# Patient Record
Sex: FEMALE | Race: WHITE | Employment: FULL TIME | URBAN - METROPOLITAN AREA
[De-identification: names, ages, dates, MRNs, and addresses within clinical notes are randomized per-mention and may not be internally consistent; named-entity substitution may affect disease eponyms.]

---

## 2017-01-13 RX ORDER — IBUPROFEN 800 MG/1
TABLET ORAL
Qty: 120 TABLET | Refills: 0 | Status: SHIPPED | OUTPATIENT
Start: 2017-01-13 | End: 2017-04-01 | Stop reason: SDUPTHER

## 2017-04-03 RX ORDER — IBUPROFEN 800 MG/1
TABLET ORAL
Qty: 120 TABLET | Refills: 0 | Status: SHIPPED | OUTPATIENT
Start: 2017-04-03 | End: 2017-07-13 | Stop reason: SDUPTHER

## 2017-06-14 RX ORDER — IBUPROFEN 800 MG/1
TABLET ORAL
Qty: 120 TABLET | Refills: 0 | Status: SHIPPED | OUTPATIENT
Start: 2017-06-14 | End: 2018-03-09

## 2018-09-27 ENCOUNTER — HOSPITAL ENCOUNTER (OUTPATIENT)
Dept: WOMENS IMAGING | Age: 43
Discharge: HOME | End: 2018-09-29
Payer: COMMERCIAL

## 2018-09-27 DIAGNOSIS — Z12.39 BREAST CANCER SCREENING: ICD-10-CM

## 2018-09-27 DIAGNOSIS — N64.4 PAIN OF BOTH BREASTS: ICD-10-CM

## 2018-09-27 DIAGNOSIS — N63.0 BREAST MASS IN FEMALE: ICD-10-CM

## 2018-09-27 PROCEDURE — G0279 TOMOSYNTHESIS, MAMMO: HCPCS

## 2018-09-27 PROCEDURE — 76642 ULTRASOUND BREAST LIMITED: CPT

## 2018-10-01 PROBLEM — E55.9 VITAMIN D DEFICIENCY: Status: ACTIVE | Noted: 2018-10-01

## 2018-10-01 PROBLEM — E78.2 MIXED HYPERLIPIDEMIA: Status: ACTIVE | Noted: 2018-10-01

## 2019-11-19 ENCOUNTER — HOSPITAL ENCOUNTER (OUTPATIENT)
Facility: CLINIC | Age: 44
Discharge: HOME | End: 2019-11-21
Payer: COMMERCIAL

## 2019-11-19 ENCOUNTER — HOSPITAL ENCOUNTER (OUTPATIENT)
Dept: GENERAL RADIOLOGY | Facility: CLINIC | Age: 44
Discharge: HOME | End: 2019-11-21
Payer: COMMERCIAL

## 2019-11-19 DIAGNOSIS — R19.7 DIARRHEA, UNSPECIFIED TYPE: ICD-10-CM

## 2019-11-19 PROCEDURE — 74019 RADEX ABDOMEN 2 VIEWS: CPT

## 2020-03-24 ENCOUNTER — OFFICE VISIT (OUTPATIENT)
Dept: PRIMARY CARE CLINIC | Age: 45
End: 2020-03-24
Payer: COMMERCIAL

## 2020-03-24 VITALS
TEMPERATURE: 99.2 F | HEART RATE: 105 BPM | WEIGHT: 150 LBS | SYSTOLIC BLOOD PRESSURE: 140 MMHG | RESPIRATION RATE: 18 BRPM | BODY MASS INDEX: 24.99 KG/M2 | DIASTOLIC BLOOD PRESSURE: 100 MMHG | HEIGHT: 65 IN | OXYGEN SATURATION: 98 %

## 2020-03-24 LAB
INFLUENZA A ANTIBODY: NEGATIVE
INFLUENZA B ANTIBODY: NEGATIVE

## 2020-03-24 PROCEDURE — 99214 OFFICE O/P EST MOD 30 MIN: CPT | Performed by: NURSE PRACTITIONER

## 2020-03-24 PROCEDURE — 87804 INFLUENZA ASSAY W/OPTIC: CPT | Performed by: NURSE PRACTITIONER

## 2020-03-24 RX ORDER — ALBUTEROL SULFATE 90 UG/1
2 AEROSOL, METERED RESPIRATORY (INHALATION) EVERY 6 HOURS PRN
Qty: 1 INHALER | Refills: 0 | Status: SHIPPED | OUTPATIENT
Start: 2020-03-24 | End: 2020-04-17

## 2020-03-24 ASSESSMENT — ENCOUNTER SYMPTOMS
SHORTNESS OF BREATH: 1
VOMITING: 0
RHINORRHEA: 0
CHEST TIGHTNESS: 1
COUGH: 1
EYE PAIN: 0
SORE THROAT: 0
ABDOMINAL DISTENTION: 0
NAUSEA: 0

## 2020-03-24 NOTE — PROGRESS NOTES
Pt is here for cough and SOB. She states her 's PCP reviewed his xray and believes her  may be positive for COVID-19. Her symptoms started over the weekend.

## 2020-03-24 NOTE — LETTER
[address] [place] [address]  [facility]  Phone: [phone]  Fax: Maxwell [phone], APRN - CNP        March 24, 2020     Patient: Joel Espinoza   YOB: 1975   Date of Visit: 3/24/2020       To Whom it May Concern:    Joel Espinoza was seen in my clinic on 3/24/2020. She may return to work on 04/07/2020. If you have any questions or concerns, please don't hesitate to call.     Sincerely,         Nicole Zapata, GERARDO - CNP

## 2020-03-24 NOTE — PROGRESS NOTES
[facility]  [facility]  [address]  [address] [address]  Dept: [phone]  Dept Fax: [phone]    Norma Urias is a 45 y.o. female who presents today for her medical conditions/complaints of   Chief Complaint   Patient presents with    Cough    Shortness of Breath          HPI:     BP (!) 140/100 (Site: Left Upper Arm, Position: Sitting)   Pulse 105   Temp 99.2 °F (37.3 °C) (Temporal)   Resp 18   Ht 5' 5\" (1.651 m)   Wt 150 lb (68 kg)   LMP 03/09/2020   SpO2 98%   BMI 24.96 kg/m²       HPI Pt presented to the urgent care today with c/o chills, fever (temp 99.1). This is a new problem. The current episode started 5 days ago. The problem has been gradually worsening since onset. Associated symptoms include: fatigue, body aches, dry cough, SOB with activity . Pertinent negatives include: No sore throat, ear pain, nausea, vomiting . Pt has tried Motrin with little improvement.  ill with similar symptoms.- feeling better. Pt saw her PCP yesterday, was not started on any medications for symptoms. Smoker. Past Medical History:   Diagnosis Date    Allergic rhinitis 7/28/2015    DJD of shoulder     bilateral    Fibromyalgia     Mixed hyperlipidemia 10/1/2018        Past Surgical History:   Procedure Laterality Date    ANTERIOR CRUCIATE LIGAMENT REPAIR      Rt knee    TUBAL LIGATION         History reviewed. No pertinent family history. Social History     Tobacco Use    Smoking status: Current Some Day Smoker     Packs/day: 2.00    Smokeless tobacco: Never Used   Substance Use Topics    Alcohol use: Yes     Alcohol/week: 0.0 standard drinks     Comment: daily 6-8 beers        Prior to Visit Medications    Medication Sig Taking?  Authorizing Provider   traMADol (ULTRAM) 50 MG tablet TAKE 1 TABLET BY MOUTH EVERY 8 HOURS AS NEEDED FOR PAIN REDUCE DOSE AS PAIN Claudean Yazoo Yes Reji Robledo MD   ibuprofen (ADVIL;MOTRIN) plan. Follow up as directed.      Electronically signed by GERARDO Ruano CNP on 3/24/2020 at 3:27 PM

## 2020-03-24 NOTE — PATIENT INSTRUCTIONS
Preventing the Spread of Coronavirus Disease 2019 in Homes and Residential Communities:   For the most recent information go to: [email or web address]

## 2020-03-26 ENCOUNTER — TELEPHONE (OUTPATIENT)
Dept: PRIMARY CARE CLINIC | Age: 45
End: 2020-03-26

## 2020-03-26 RX ORDER — BENZONATATE 100 MG/1
100 CAPSULE ORAL 3 TIMES DAILY PRN
Qty: 30 CAPSULE | Refills: 0 | Status: SHIPPED | OUTPATIENT
Start: 2020-03-26 | End: 2020-04-05

## 2020-03-26 RX ORDER — DOXYCYCLINE HYCLATE 100 MG
100 TABLET ORAL 2 TIMES DAILY
Qty: 20 TABLET | Refills: 0 | Status: SHIPPED | OUTPATIENT
Start: 2020-03-26 | End: 2020-04-17

## 2020-03-26 NOTE — TELEPHONE ENCOUNTER
I will send an antibiotic and cough medication over her to pharmacy for her. If she is not improving- feeling worse or having SOB- she needs to be seen and evaluated in the ER.

## 2020-11-11 ENCOUNTER — INITIAL CONSULT (OUTPATIENT)
Dept: PHYSICAL MEDICINE AND REHAB | Age: 45
End: 2020-11-11
Payer: COMMERCIAL

## 2020-11-11 VITALS
SYSTOLIC BLOOD PRESSURE: 125 MMHG | HEIGHT: 64 IN | WEIGHT: 144.8 LBS | DIASTOLIC BLOOD PRESSURE: 88 MMHG | BODY MASS INDEX: 24.72 KG/M2 | HEART RATE: 81 BPM | TEMPERATURE: 98.6 F

## 2020-11-11 PROCEDURE — 20553 NJX 1/MLT TRIGGER POINTS 3/>: CPT | Performed by: PHYSICAL MEDICINE & REHABILITATION

## 2020-11-11 PROCEDURE — 99204 OFFICE O/P NEW MOD 45 MIN: CPT | Performed by: PHYSICAL MEDICINE & REHABILITATION

## 2020-11-11 RX ORDER — TRAMADOL HYDROCHLORIDE 50 MG/1
50 TABLET ORAL EVERY 6 HOURS PRN
COMMUNITY
End: 2020-12-02 | Stop reason: SDUPTHER

## 2020-11-11 RX ORDER — LIDOCAINE HYDROCHLORIDE 10 MG/ML
8 INJECTION, SOLUTION INFILTRATION; PERINEURAL ONCE
Status: COMPLETED | OUTPATIENT
Start: 2020-11-11 | End: 2020-11-11

## 2020-11-11 RX ORDER — DULOXETIN HYDROCHLORIDE 30 MG/1
30 CAPSULE, DELAYED RELEASE ORAL DAILY
Qty: 30 CAPSULE | Refills: 3 | Status: SHIPPED | OUTPATIENT
Start: 2020-11-11 | End: 2021-01-11 | Stop reason: SDUPTHER

## 2020-11-11 RX ADMIN — LIDOCAINE HYDROCHLORIDE 8 ML: 10 INJECTION, SOLUTION INFILTRATION; PERINEURAL at 15:59

## 2020-11-11 NOTE — PROGRESS NOTES
TUBAL LIGATION       Family History   Problem Relation Age of Onset    Diabetes Mother     Hypertension Mother      Social History     Socioeconomic History    Marital status: Single     Spouse name: None    Number of children: None    Years of education: None    Highest education level: None   Occupational History    None   Social Needs    Financial resource strain: None    Food insecurity     Worry: None     Inability: None    Transportation needs     Medical: None     Non-medical: None   Tobacco Use    Smoking status: Current Some Day Smoker     Packs/day: 2.00    Smokeless tobacco: Never Used   Substance and Sexual Activity    Alcohol use: Yes     Alcohol/week: 0.0 standard drinks     Comment: daily 6-8 beers    Drug use: No    Sexual activity: Yes   Lifestyle    Physical activity     Days per week: None     Minutes per session: None    Stress: None   Relationships    Social connections     Talks on phone: None     Gets together: None     Attends Caodaism service: None     Active member of club or organization: None     Attends meetings of clubs or organizations: None     Relationship status: None    Intimate partner violence     Fear of current or ex partner: None     Emotionally abused: None     Physically abused: None     Forced sexual activity: None   Other Topics Concern    None   Social History Narrative    None          Current Outpatient Medications   Medication Sig Dispense Refill    traMADol (ULTRAM) 50 MG tablet Take 50 mg by mouth every 6 hours as needed for Pain.       DULoxetine (CYMBALTA) 30 MG extended release capsule Take 1 capsule by mouth daily 30 capsule 3    busPIRone HCl (BUSPAR PO) Take by mouth      albuterol sulfate HFA (PROVENTIL HFA) 108 (90 Base) MCG/ACT inhaler Inhale 2 puffs into the lungs every 6 hours as needed for Wheezing 1 Inhaler 0    tiZANidine (ZANAFLEX) 4 MG tablet Take 1 tablet by mouth every 8 hours as needed (muscle spasms) 90 tablet 1    lisinopril (PRINIVIL;ZESTRIL) 20 MG tablet TAKE 1 TABLET BY MOUTH DAILY FOR HYPERTENSION. 90 tablet 0    ibuprofen (ADVIL;MOTRIN) 800 MG tablet TAKE 1 TABLET BY MOUTH EVERY 6 HOURS AS NEEDED FOR PAIN 120 tablet 0    traZODone (DESYREL) 100 MG tablet Take 1 tablet by mouth nightly 90 tablet 1    Menthol, Topical Analgesic, (ICY HOT EX) Apply topically Patient using the spray      nicotine polacrilex (COMMIT) 4 MG lozenge 1 Lozg every 2 hours PRN for smoking urge, max per day 14 (Patient not taking: Reported on 10/6/2020) 108 each 5     No current facility-administered medications for this visit. No Known Allergies    Subjective:     Review of Systems  Constitutional: Negative for fever, chills and unexpected weight change. HEENT: Negative for trouble swallowing. No acute vision changes. Respiratory: Positive for cough and negative for shortness of breath. Cardiovascular: Negative for chest pain. Endocrine: Negative for polyuria. Genitourinary: Negative for dysuria, urgency,frequency and difficulty urinating. Musculoskeletal: Positive for stiff joints. Neurological: Positive for headaches. C/o intermittent numbness/tingling R 4th and 5th digits, worse when leaning on her R elbow. Dermatologic: Negative rashes, ulcers, or lesions. Psychiatric: Positive for depressed mood or anxiety. Objective:     Physical Exam  /88   Pulse 81   Temp 98.6 °F (37 °C) (Temporal)   Ht 5' 4\" (1.626 m)   Wt 144 lb 12.8 oz (65.7 kg)   BMI 24.85 kg/m²   Constitutional: She is in no distress. She appears well-developed and well-nourished. HEENT:  Normocephalic. EOMI. PERRL. Mucous membranes pink and moist.   Pulmonary/Chest: Respirations WNL and unlabored. Skin: Skin is warm, dry and intact with good turgor. Psychiatric: She has a normal mood and anxious affect. Her behavior is normal. Thought content normal.   MSK: Full cervical spine ROM in all planes.  Exquisite tenderness over trigger points B trapezius and thoracic paraspinal muscles, worse medial to each scapula. Less palpable trigger points noted in lumbar spine. Strength 4+/5 on B , wrist extension, finger extension. BLE key muscles 4+/5. Neurological: She is alert and oriented to person, place, and time. She has DTRs 2+. Sensation intact to light touch. EXTREMITIES: No edema. No calf tenderness to palpation bilaterally. Nursing note and vitals reviewed. Imaging:  None recent    PROCEDURE:  Trigger Point Procedural Note    Indication: Severe pain and pain control    Procedure: 14 Trigger Points were identified in the B Trapezius muscles, B thoracic paraspinal muscles, B latissimus dorsi. The patient was placed in the appropriate position and the area over the trigger point was prepped with iodine and alcohol. Injection was performed into the trigger point area using 0.5 ml Lidocaine 1% into each of the 14 trigger point(s) followed by dry needling. The injection site was covered with a bandage. Complications: None, patient tolerated procedure well. Assessment:       Diagnosis Orders   1. Fibromyalgia     2. Trigger point of thoracic region            Plan:      Patient agreeable to trial of Cymbalta. Encouraged her to use routinely for 4 weeks. Reviewed benefits and potential adverse effects. Since she is using Trazodone and Buspar only rarely and prn, she will hold using them while on the Cymbalta trial. She feels pain is what impairs her sleep the most.    Trigger point injections as above. Advised heat, stretching, and theracane deep tissue massage for muscle tightness/stiffness/trigger points. Educated her on trapezius/latissimus dorsi stretches. Educated her on best long-term management of fibromyalgia pain being low impact/no impact aerobic activity (I.e. swimming, stationary cycle, elliptical) with goal of 30 minutes TIW and for her to work up to that goal as tolerated.   No orders of the defined types were placed in this encounter. Orders Placed This Encounter   Medications    DULoxetine (CYMBALTA) 30 MG extended release capsule     Sig: Take 1 capsule by mouth daily     Dispense:  30 capsule     Refill:  3    lidocaine 1 % injection 8 mL     Return in about 6 weeks (around 12/23/2020). Can repeat trigger point injections then if effective. Evaluate Cymbalta at that time, can increase to 60 mg if needed and if tolerated. Electronically signed by Kristi Pinedo MD on 11/11/2020 at 4:02 PM.     Please note that this chart was generated using voicerecognition Dragon dictation software. Although every effort was made to ensurethe accuracy of this automated transcription, some errors in transcription may haveoccurred.

## 2021-01-11 ENCOUNTER — OFFICE VISIT (OUTPATIENT)
Dept: PHYSICAL MEDICINE AND REHAB | Age: 46
End: 2021-01-11
Payer: COMMERCIAL

## 2021-01-11 VITALS
SYSTOLIC BLOOD PRESSURE: 156 MMHG | TEMPERATURE: 98.4 F | WEIGHT: 144.8 LBS | BODY MASS INDEX: 24.85 KG/M2 | DIASTOLIC BLOOD PRESSURE: 98 MMHG | HEART RATE: 98 BPM

## 2021-01-11 DIAGNOSIS — M79.10 MYALGIA: ICD-10-CM

## 2021-01-11 DIAGNOSIS — M79.7 FIBROMYALGIA: Primary | ICD-10-CM

## 2021-01-11 DIAGNOSIS — M79.10 TRIGGER POINT: ICD-10-CM

## 2021-01-11 PROCEDURE — 20553 NJX 1/MLT TRIGGER POINTS 3/>: CPT | Performed by: PHYSICAL MEDICINE & REHABILITATION

## 2021-01-11 PROCEDURE — 99212 OFFICE O/P EST SF 10 MIN: CPT | Performed by: PHYSICAL MEDICINE & REHABILITATION

## 2021-01-11 RX ORDER — LIDOCAINE HYDROCHLORIDE 10 MG/ML
5 INJECTION, SOLUTION INFILTRATION; PERINEURAL ONCE
Status: COMPLETED | OUTPATIENT
Start: 2021-01-11 | End: 2021-01-11

## 2021-01-11 RX ORDER — DULOXETIN HYDROCHLORIDE 30 MG/1
30 CAPSULE, DELAYED RELEASE ORAL DAILY
Qty: 30 CAPSULE | Refills: 3 | Status: SHIPPED | OUTPATIENT
Start: 2021-01-11 | End: 2021-10-27 | Stop reason: SDUPTHER

## 2021-01-11 RX ADMIN — LIDOCAINE HYDROCHLORIDE 5 ML: 10 INJECTION, SOLUTION INFILTRATION; PERINEURAL at 16:49

## 2021-01-11 SDOH — HEALTH STABILITY: MENTAL HEALTH: HOW MANY STANDARD DRINKS CONTAINING ALCOHOL DO YOU HAVE ON A TYPICAL DAY?: NOT ASKED

## 2021-01-11 SDOH — HEALTH STABILITY: MENTAL HEALTH: HOW OFTEN DO YOU HAVE A DRINK CONTAINING ALCOHOL?: NOT ASKED

## 2021-01-11 NOTE — PROGRESS NOTES
[facility]  [facility]  [address] [place] [address]  Dept: [phone]  Dept Fax: [phone]    Outpatient Followup Note    Jess Horn, 39 y.o., female, presents for follow up c/o of Follow-up (may want injections today;  cymbalta is helping)  . HPI:     HPI  Patient with history of fibromyalgia for past 17 years who is being seen in follow up today. She responded well to trigger point injections at last visit and notes improvement in her symptoms on Cymbalta. She feels pain is approximately 50% better down from 8-9/10 on the pain scale for 4-5/10 on the pain scale. She reports some initial fatigue on the Cymbalta which has improved. She also reports that her sleep is significantly improved too. She has not started doing the recommended aerobic activity as she feels that her job is very physical. She does note that the muscle tightness in her upper trapezius and neck muscles started to return approximately a week ago. She would like to repeat trigger point injections today.      Past Medical History:   Diagnosis Date    Allergic rhinitis 7/28/2015    DJD of shoulder     bilateral    Fibromyalgia     Mixed hyperlipidemia 10/1/2018      Past Surgical History:   Procedure Laterality Date    ANTERIOR CRUCIATE LIGAMENT REPAIR      Rt knee    TUBAL LIGATION       Family History   Problem Relation Age of Onset    Diabetes Mother     Hypertension Mother      Social History     Socioeconomic History    Marital status: Single     Spouse name: None    Number of children: None    Years of education: None    Highest education level: None   Occupational History    None   Social Needs    Financial resource strain: None    Food insecurity     Worry: None     Inability: None    Transportation needs     Medical: None     Non-medical: None   Tobacco Use    Smoking status: Current Some Day Smoker     Packs/day: 2.00    Smokeless tobacco: Never Used   Substance and Sexual Activity    Alcohol use: Yes     Comment: about 6 beers daily    Drug use: No    Sexual activity: Yes   Lifestyle    Physical activity     Days per week: None     Minutes per session: None    Stress: None   Relationships    Social connections     Talks on phone: None     Gets together: None     Attends Worship service: None     Active member of club or organization: None     Attends meetings of clubs or organizations: None     Relationship status: None    Intimate partner violence     Fear of current or ex partner: None     Emotionally abused: None     Physically abused: None     Forced sexual activity: None   Other Topics Concern    None   Social History Narrative    None       Current Outpatient Medications   Medication Sig Dispense Refill    DULoxetine (CYMBALTA) 30 MG extended release capsule Take 1 capsule by mouth daily 30 capsule 3    traMADol (ULTRAM) 50 MG tablet TAKE 1 TABLET BY MOUTH EVERY 6 HOURS AS NEEDED FOR PAIN 120 tablet 0    ibuprofen (ADVIL;MOTRIN) 800 MG tablet TAKE 1 TABLET BY MOUTH EVERY 6 HOURS AS NEEDED FOR PAIN 120 tablet 0    lisinopril (PRINIVIL;ZESTRIL) 20 MG tablet TAKE 1/2 TO 1 TABLET BY MOUTH DAILY FOR HIGH BLOOD PRESSURE 90 tablet 0    albuterol sulfate HFA (PROVENTIL HFA) 108 (90 Base) MCG/ACT inhaler Inhale 2 puffs into the lungs every 6 hours as needed for Wheezing 1 Inhaler 0    tiZANidine (ZANAFLEX) 4 MG tablet Take 1 tablet by mouth every 8 hours as needed (muscle spasms) 90 tablet 1    Menthol, Topical Analgesic, (ICY HOT EX) Apply topically Patient using the spray       No current facility-administered medications for this visit. No Known Allergies    Subjective:      Review of Systems  Constitutional: Negative for fever, chills and unexpected weight change. HENT: Negative for trouble swallowing. Respiratory: Negative for cough and shortness of breath. Cardiovascular: Negative for chest pain.    Endocrine: Negative for polyuria. Genitourinary: Negative for dysuria, urgency, frequency, incontinence and difficulty urinating. Gastrointestinal: Negative for constipation or diarrhea. Musculoskeletal: Negative for arthralgias. Neurological: Positive for headaches but frequency and severity improved after trigger point injections. Denies numbness, or tingling. Psychiatric: Negative for depressed mood. Anxiety today about injections as she gets nervous with injections. Objective:     Physical Exam  BP (!) 156/98   Pulse 98   Temp 98.4 °F (36.9 °C)   Wt 144 lb 12.8 oz (65.7 kg)   BMI 24.85 kg/m²   Constitutional: She appears well-developed and well-nourished. In no distress. HEENT: NCAT, PERRL, EOMI. Mucous membranes pink and moist.  Pulmonary/Chest: Respirations WNL and unlabored. MSK: Functional ROM cervical spine in all planes. Palpable muscle tightness B upper trapezius and cervical paraspinal muscles with referred pain. Significant improvement from previous exam.  Strength 5/5 key muscles BUEs. Neurological: She is alert and oriented to person, place, and time. Skin: Skin is warm dry and intact with good turgor. Psychiatric: She has a normal mood and affect. Her behavior is normal. Thought content normal.   Nursing note and vitals reviewed. Imaging: None new    PROCEDURE:  Trigger Point Procedural Note    Indication: Severe pain and pain control    Procedure: 9 Trigger Points were identified in the B upper trapezius and B cervical paraspinal muscles. The patient was placed in the appropriate position and the area over the trigger point was prepped with iodine and alcohol. Injection was performed into the trigger point area using 0.5 ml Lidocaine 1% into each of the 9 trigger point(s) followed by dry needling. The injection site was covered with a bandage. Complications: None, patient tolerated procedure well. Assessment:       Diagnosis Orders   1. Fibromyalgia  lidocaine 1 % injection 5 mL   2. Myalgia  lidocaine 1 % injection 5 mL   3. Trigger point  lidocaine 1 % injection 5 mL        Plan: To continue stretching and heat after trigger point injections as above. Continue current dose Cymbalta - renewal sent to pharmacy. No orders of the defined types were placed in this encounter. Orders Placed This Encounter   Medications    DULoxetine (CYMBALTA) 30 MG extended release capsule     Sig: Take 1 capsule by mouth daily     Dispense:  30 capsule     Refill:  3    lidocaine 1 % injection 5 mL     Return in about 8 weeks (around 3/8/2021). Can repeat trigger point injections. Electronically signedby Gregory Viramontes MD on 1/12/2021 at 1:29 PM.     Please note that this chartwas generated using voice recognition Dragon dictation software. Although everyeffort was made to ensure the accuracy of this automated transcription, some errorsin transcription may have occurred.

## 2021-01-25 ENCOUNTER — HOSPITAL ENCOUNTER (EMERGENCY)
Facility: CLINIC | Age: 46
Discharge: HOME | End: 2021-01-25
Attending: EMERGENCY MEDICINE
Payer: COMMERCIAL

## 2021-01-25 VITALS
HEART RATE: 102 BPM | DIASTOLIC BLOOD PRESSURE: 109 MMHG | HEIGHT: 65 IN | OXYGEN SATURATION: 98 % | TEMPERATURE: 98.3 F | RESPIRATION RATE: 14 BRPM | WEIGHT: 144 LBS | BODY MASS INDEX: 23.99 KG/M2 | SYSTOLIC BLOOD PRESSURE: 159 MMHG

## 2021-01-25 DIAGNOSIS — T78.40XA ALLERGIC REACTION, INITIAL ENCOUNTER: ICD-10-CM

## 2021-01-25 DIAGNOSIS — R60.9 PERIPHERAL EDEMA: Primary | ICD-10-CM

## 2021-01-25 LAB
ABSOLUTE EOS #: 0.1 K/UL (ref 0–0.4)
ABSOLUTE IMMATURE GRANULOCYTE: ABNORMAL K/UL (ref 0–0.3)
ABSOLUTE LYMPH #: 1.7 K/UL (ref 1–4.8)
ABSOLUTE MONO #: 1.1 K/UL (ref 0.1–1.2)
ALBUMIN SERPL-MCNC: 4 G/DL (ref 3.5–5.2)
ALBUMIN/GLOBULIN RATIO: 1.1 (ref 1–2.5)
ALP BLD-CCNC: 139 U/L (ref 35–104)
ALT SERPL-CCNC: 10 U/L (ref 5–33)
ANION GAP SERPL CALCULATED.3IONS-SCNC: 14 MMOL/L (ref 9–17)
AST SERPL-CCNC: 21 U/L
BASOPHILS # BLD: 1 % (ref 0–2)
BASOPHILS ABSOLUTE: 0 K/UL (ref 0–0.2)
BILIRUB SERPL-MCNC: 0.8 MG/DL (ref 0.3–1.2)
BUN BLDV-MCNC: 6 MG/DL (ref 6–20)
BUN/CREAT BLD: ABNORMAL (ref 9–20)
CALCIUM SERPL-MCNC: 9.6 MG/DL (ref 8.6–10.4)
CHLORIDE BLD-SCNC: 96 MMOL/L (ref 98–107)
CO2: 22 MMOL/L (ref 20–31)
CREAT SERPL-MCNC: 0.7 MG/DL (ref 0.5–0.9)
DIFFERENTIAL TYPE: ABNORMAL
EOSINOPHILS RELATIVE PERCENT: 1 % (ref 1–4)
GFR AFRICAN AMERICAN: >60 ML/MIN
GFR NON-AFRICAN AMERICAN: >60 ML/MIN
GFR SERPL CREATININE-BSD FRML MDRD: ABNORMAL ML/MIN/{1.73_M2}
GFR SERPL CREATININE-BSD FRML MDRD: ABNORMAL ML/MIN/{1.73_M2}
GLUCOSE BLD-MCNC: 150 MG/DL (ref 70–99)
HCG QUALITATIVE: NEGATIVE
HCT VFR BLD CALC: 46.1 % (ref 36–46)
HEMOGLOBIN: 16.1 G/DL (ref 12–16)
IMMATURE GRANULOCYTES: ABNORMAL %
LYMPHOCYTES # BLD: 18 % (ref 24–44)
MCH RBC QN AUTO: 33.3 PG (ref 26–34)
MCHC RBC AUTO-ENTMCNC: 34.9 G/DL (ref 31–37)
MCV RBC AUTO: 95.4 FL (ref 80–100)
MONOCYTES # BLD: 12 % (ref 2–11)
NRBC AUTOMATED: ABNORMAL PER 100 WBC
PDW BLD-RTO: 12.3 % (ref 12.5–15.4)
PLATELET # BLD: 270 K/UL (ref 140–450)
PLATELET ESTIMATE: ABNORMAL
PMV BLD AUTO: 7.2 FL (ref 6–12)
POTASSIUM SERPL-SCNC: 4.1 MMOL/L (ref 3.7–5.3)
RBC # BLD: 4.83 M/UL (ref 4–5.2)
RBC # BLD: ABNORMAL 10*6/UL
SEG NEUTROPHILS: 68 % (ref 36–66)
SEGMENTED NEUTROPHILS ABSOLUTE COUNT: 6.6 K/UL (ref 1.8–7.7)
SODIUM BLD-SCNC: 132 MMOL/L (ref 135–144)
TOTAL PROTEIN: 7.5 G/DL (ref 6.4–8.3)
WBC # BLD: 9.5 K/UL (ref 3.5–11)
WBC # BLD: ABNORMAL 10*3/UL

## 2021-01-25 PROCEDURE — 6360000002 HC RX W HCPCS: Performed by: EMERGENCY MEDICINE

## 2021-01-25 PROCEDURE — 2500000003 HC RX 250 WO HCPCS: Performed by: EMERGENCY MEDICINE

## 2021-01-25 PROCEDURE — 84703 CHORIONIC GONADOTROPIN ASSAY: CPT

## 2021-01-25 PROCEDURE — 36415 COLL VENOUS BLD VENIPUNCTURE: CPT

## 2021-01-25 PROCEDURE — 96374 THER/PROPH/DIAG INJ IV PUSH: CPT

## 2021-01-25 PROCEDURE — 2580000003 HC RX 258: Performed by: EMERGENCY MEDICINE

## 2021-01-25 PROCEDURE — 80053 COMPREHEN METABOLIC PANEL: CPT

## 2021-01-25 PROCEDURE — 96375 TX/PRO/DX INJ NEW DRUG ADDON: CPT

## 2021-01-25 PROCEDURE — 85025 COMPLETE CBC W/AUTO DIFF WBC: CPT

## 2021-01-25 PROCEDURE — 99283 EMERGENCY DEPT VISIT LOW MDM: CPT

## 2021-01-25 RX ORDER — DEXAMETHASONE SODIUM PHOSPHATE 10 MG/ML
10 INJECTION, SOLUTION INTRAMUSCULAR; INTRAVENOUS ONCE
Status: COMPLETED | OUTPATIENT
Start: 2021-01-25 | End: 2021-01-25

## 2021-01-25 RX ORDER — KETOROLAC TROMETHAMINE 15 MG/ML
15 INJECTION, SOLUTION INTRAMUSCULAR; INTRAVENOUS ONCE
Status: COMPLETED | OUTPATIENT
Start: 2021-01-25 | End: 2021-01-25

## 2021-01-25 RX ORDER — PREDNISONE 10 MG/1
10 TABLET ORAL SEE ADMIN INSTRUCTIONS
Qty: 21 TABLET | Refills: 0 | Status: SHIPPED | OUTPATIENT
Start: 2021-01-25 | End: 2021-02-01

## 2021-01-25 RX ORDER — 0.9 % SODIUM CHLORIDE 0.9 %
1000 INTRAVENOUS SOLUTION INTRAVENOUS ONCE
Status: COMPLETED | OUTPATIENT
Start: 2021-01-25 | End: 2021-01-25

## 2021-01-25 RX ORDER — DIPHENHYDRAMINE HYDROCHLORIDE 50 MG/ML
25 INJECTION INTRAMUSCULAR; INTRAVENOUS ONCE
Status: COMPLETED | OUTPATIENT
Start: 2021-01-25 | End: 2021-01-25

## 2021-01-25 RX ADMIN — KETOROLAC TROMETHAMINE 15 MG: 15 INJECTION, SOLUTION INTRAMUSCULAR; INTRAVENOUS at 17:00

## 2021-01-25 RX ADMIN — FAMOTIDINE 20 MG: 10 INJECTION, SOLUTION INTRAVENOUS at 17:03

## 2021-01-25 RX ADMIN — DIPHENHYDRAMINE HYDROCHLORIDE 25 MG: 50 INJECTION, SOLUTION INTRAMUSCULAR; INTRAVENOUS at 17:06

## 2021-01-25 RX ADMIN — SODIUM CHLORIDE 1000 ML: 9 INJECTION, SOLUTION INTRAVENOUS at 16:59

## 2021-01-25 RX ADMIN — DEXAMETHASONE SODIUM PHOSPHATE 10 MG: 10 INJECTION, SOLUTION INTRAMUSCULAR; INTRAVENOUS at 17:01

## 2021-01-25 ASSESSMENT — PAIN DESCRIPTION - ORIENTATION: ORIENTATION: RIGHT;LEFT

## 2021-01-25 ASSESSMENT — ENCOUNTER SYMPTOMS
DIARRHEA: 0
SORE THROAT: 0
SHORTNESS OF BREATH: 0
VOMITING: 0

## 2021-01-25 ASSESSMENT — PAIN SCALES - GENERAL: PAINLEVEL_OUTOF10: 6

## 2021-01-25 ASSESSMENT — PAIN DESCRIPTION - FREQUENCY: FREQUENCY: CONTINUOUS

## 2021-01-25 ASSESSMENT — PAIN DESCRIPTION - LOCATION: LOCATION: ANKLE;HAND

## 2021-01-25 NOTE — ED PROVIDER NOTES
[facility]  [address] [facility]  Phone: [facility] ED  EMERGENCY DEPARTMENT ENCOUNTER      Pt Name: Jim Choi  MRN: [id number]  [place] 1975  Date of evaluation: 1/25/2021  Provider: Yulissa Santo DO    CHIEF COMPLAINT       Chief Complaint   Patient presents with    Allergic Reaction     swelling hands and feet          HISTORY OF PRESENT ILLNESS   (Location/Symptom, Timing/Onset,Context/Setting, Quality, Duration, Modifying Factors, Severity)  Note limiting factors. Jim Choi is a 39 y.o. female who presents to the emergency department the evaluation of possible allergic reaction. States she noticed some slight swelling of her hands and feet earlier today and it got worse throughout the day. She also states she felt some tightness in her throat, however, she does states she is had an upper respiratory infection for about a month with sore throat. 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 She has not had any new foods, no new soaps or detergents and states actually recently they removed lavender soap from their home. She does have a couple of pets. She states that she has been taking multiple over-the-counter medications that include Robitussin, Motrin, Tylenol, throat spray, prescribed Tessalon Perles, multiple different things for recent upper respiratory infection. She had a negative Covid test a couple of days ago. Patient is having no difficulty breathing or swallowing but feels quite anxious right now    Nursing Notes were reviewed. REVIEW OF SYSTEMS    (2-9systems for level 4, 10 or more for level 5)     Review of Systems   Constitutional: Negative for fever. HENT: Negative for sore throat. Respiratory: Negative for shortness of breath. Cardiovascular: Negative for chest pain. Gastrointestinal: Negative for diarrhea and vomiting. Genitourinary: Negative for dysuria. Musculoskeletal:        Swelling hands and feet    Skin: Negative for rash. Neurological: Negative for weakness. All other systems reviewed and are negative. Except asnoted above the remainder of the review of systems was reviewed and negative. PAST MEDICAL HISTORY     Past Medical History:   Diagnosis Date    Allergic rhinitis 7/28/2015    DJD of shoulder     bilateral    Fibromyalgia     Mixed hyperlipidemia 10/1/2018         SURGICAL HISTORY       Past Surgical History:   Procedure Laterality Date    ANTERIOR CRUCIATE LIGAMENT REPAIR      Rt knee    TUBAL LIGATION           CURRENT MEDICATIONS     Previous Medications    ALBUTEROL SULFATE HFA (PROVENTIL HFA) 108 (90 BASE) MCG/ACT INHALER    Inhale 2 puffs into the lungs every 6 hours as needed for Wheezing    DULOXETINE (CYMBALTA) 30 MG EXTENDED RELEASE CAPSULE    Take 1 capsule by mouth daily    IBUPROFEN (ADVIL;MOTRIN) 800 MG TABLET    TAKE 1 TABLET BY MOUTH EVERY 6 HOURS AS NEEDED FOR PAIN    LISINOPRIL (PRINIVIL;ZESTRIL) 20 MG TABLET    TAKE 1/2 TO 1 TABLET BY MOUTH DAILY FOR HIGH BLOOD PRESSURE    MENTHOL, TOPICAL ANALGESIC, (ICY HOT EX)    Apply topically Patient using the spray    TIZANIDINE (ZANAFLEX) 4 MG TABLET    Take 1 tablet by mouth every 8 hours as needed (muscle spasms)    TRAMADOL (ULTRAM) 50 MG TABLET    TAKE 1 TABLET BY MOUTH EVERY 6 HOURS AS NEEDED FOR PAIN       ALLERGIES     Patient has no known allergies.     FAMILY HISTORY       Family History   Problem Relation Age of Onset    Diabetes Mother     Hypertension Mother           SOCIAL HISTORY       Social History     Socioeconomic History    Marital status: Single     Spouse name: None    Number of children: None    Years of education: None    Highest education level: None Occupational History    None   Social Needs    Financial resource strain: None    Food insecurity     Worry: None     Inability: None    Transportation needs     Medical: None     Non-medical: None   Tobacco Use    Smoking status: Current Some Day Smoker     Packs/day: 2.00    Smokeless tobacco: Never Used   Substance and Sexual Activity    Alcohol use: Yes     Comment: about 6 beers daily    Drug use: No    Sexual activity: Yes   Lifestyle    Physical activity     Days per week: None     Minutes per session: None    Stress: None   Relationships    Social connections     Talks on phone: None     Gets together: None     Attends Anabaptist service: None     Active member of club or organization: None     Attends meetings of clubs or organizations: None     Relationship status: None    Intimate partner violence     Fear of current or ex partner: None     Emotionally abused: None     Physically abused: None     Forced sexual activity: None   Other Topics Concern    None   Social History Narrative    None       SCREENINGS             PHYSICAL EXAM    (up to 7 for level 4, 8 or more for level 5)     ED Triage Vitals   BP Temp Temp Source Pulse Resp SpO2 Height Weight   01/25/21 1658 01/25/21 1616 01/25/21 1616 01/25/21 1616 01/25/21 1616 01/25/21 1616 01/25/21 1616 01/25/21 1616   (!) 136/101 98.3 °F (36.8 °C) Oral 129 14 98 % 5' 5\" (1.651 m) 144 lb (65.3 kg)       Physical Exam  Vitals signs and nursing note reviewed. Constitutional:       General: She is not in acute distress. Appearance: Normal appearance. She is not ill-appearing or toxic-appearing. HENT:      Head: Normocephalic and atraumatic. Nose: Nose normal. No congestion. Mouth/Throat:      Mouth: Mucous membranes are moist.      Comments: Airway widely patent, tolerating oral secretions, speaking in complete sentences  Eyes:      General:         Right eye: No discharge. Left eye: No discharge. Conjunctiva/sclera: Conjunctivae normal.   Neck:      Musculoskeletal: Normal range of motion. Cardiovascular:      Rate and Rhythm: Normal rate and regular rhythm. Pulses: Normal pulses. Heart sounds: Normal heart sounds. No murmur. Pulmonary:      Effort: Pulmonary effort is normal. No respiratory distress. Breath sounds: Normal breath sounds. No wheezing. Abdominal:      General: Abdomen is flat. There is no distension. Palpations: Abdomen is soft. Tenderness: There is no abdominal tenderness. Musculoskeletal: Normal range of motion. General: Swelling present. No deformity or signs of injury. Comments: She has 1+ nonpitting edema bilaterally in the hands and the feet standing just a little bit up above the ankles   Skin:     General: Skin is warm and dry. Capillary Refill: Capillary refill takes less than 2 seconds. Findings: No rash. Neurological:      General: No focal deficit present. Mental Status: She is alert. Mental status is at baseline. Motor: No weakness. Comments: Speaking normally. No facial asymmetry. Moving all 4 extremities. Normal gait.     Psychiatric:         Mood and Affect: Mood normal.         EMERGENCY DEPARTMENT COURSE and DIFFERENTIAL DIAGNOSIS/MDM:   Vitals:    Vitals:    01/25/21 1616 01/25/21 1658 01/25/21 1753   BP:  (!) 136/101 (!) 159/109   Pulse: 129 118 102   Resp: 14     Temp: 98.3 °F (36.8 °C)     TempSrc: Oral     SpO2: 98% 98% 98%   Weight: 65.3 kg (144 lb)     Height: 5' 5\" (1.651 m) Presents to the emergency department with a complaint described above. Vitals showed hypertension and tachycardia on arrival and she was quite anxious. Physical exam revealed some swelling of the hands and feet without any rash, no signs of systemic anaphylaxis, no swelling of the throat, no effect on her airway. Avoid to get some routine blood work, I will give some IV Decadron, IV Benadryl, IV Pepcid, IV fluids and I will reevaluate. DIAGNOSTIC RESULTS     LABS:  Labs Reviewed   CBC WITH AUTO DIFFERENTIAL - Abnormal; Notable for the following components:       Result Value    Hemoglobin 16.1 (*)     Hematocrit 46.1 (*)     RDW 12.3 (*)     Seg Neutrophils 68 (*)     Lymphocytes 18 (*)     Monocytes 12 (*)     All other components within normal limits   COMPREHENSIVE METABOLIC PANEL - Abnormal; Notable for the following components:    Glucose 150 (*)     Sodium 132 (*)     Chloride 96 (*)     Alkaline Phosphatase 139 (*)     All other components within normal limits   HCG, SERUM, QUALITATIVE       All other labs were within normal range or not returned as of this dictation. RADIOLOGY:  No orders to display         ED Course as of Jan 25 1821   Mon Jan 25, 2021 1751 Laboratory results were grossly unremarkable and on reevaluation she has improved. Her vital signs have steadily improved in the emergency department and the swelling has not gotten any worse, gone down just a little bit. I do think she is having some sort of allergic reaction or histamine mediated reaction. I have talked to her about staying home from work tomorrow, she is going to get a short course of steroids, she has a PCP appointment in 7 days and she is going to follow-up.   I have said if this persist she may need to see an allergist or immunologist, I have talked her about what to watch out for and what to return to the ER for At this time the patient is without objective evidence of an acute process requiring hospitalization or inpatient management. They have remained hemodynamically stable and are stable for discharge with outpatient follow-up. Standard anticipatory guidance given to patient upon discharge. Have given them a specific time frame in which to follow-up and who to follow-up with. I have also advised them that they should return to the emergency department if they get worse, or not getting better or develop any new or concerning symptoms. Patient demonstrates understanding.    [TS]      ED Course User Index  [TS] Maribell Hernandez DO         PROCEDURES:  Unless otherwise noted below, none     Procedures    FINAL IMPRESSION      1. Peripheral edema    2. Allergic reaction, initial encounter          DISPOSITION/PLAN   DISPOSITION Decision To Discharge 01/25/2021 05:52:34 PM      PATIENT REFERRED TO:  Koko Cherry, [address] [place] [address]  [address] [facility]  [phone]    In 1 week        DISCHARGE MEDICATIONS:  New Prescriptions    PREDNISONE (DELTASONE) 10 MG TABLET    Take 1 tablet by mouth See Admin Instructions for 7 days Take 4 tablets by mouth daily for days 1-3. Take 3 tablets by mouth daily for days 4-5. Take 2 tablets by mouth daily day 6.  Take 1 tablet by mouth on day 7          (Please note that portions of this note were completed with a voice recognition program.  Efforts were made to edit the dictations but occasionally words are mis-transcribed.)    Maribell Hernandez DO (electronically signed)  Board Certified Emergency Physician          Maribell Hernandez DO  01/25/21 4887

## 2021-01-25 NOTE — ED TRIAGE NOTES
PT presents to ER with c/o bilateral hand swelling and feet swelling and painful to touch since 11 am.

## 2022-05-04 ENCOUNTER — HOSPITAL ENCOUNTER (EMERGENCY)
Facility: CLINIC | Age: 47
Discharge: HOME | End: 2022-05-04
Attending: EMERGENCY MEDICINE
Payer: COMMERCIAL

## 2022-05-04 VITALS
DIASTOLIC BLOOD PRESSURE: 95 MMHG | SYSTOLIC BLOOD PRESSURE: 146 MMHG | WEIGHT: 140 LBS | TEMPERATURE: 97.4 F | OXYGEN SATURATION: 99 % | HEIGHT: 65 IN | RESPIRATION RATE: 20 BRPM | BODY MASS INDEX: 23.32 KG/M2 | HEART RATE: 93 BPM

## 2022-05-04 DIAGNOSIS — G50.0 TRIGEMINAL NEURALGIA OF LEFT SIDE OF FACE: Primary | ICD-10-CM

## 2022-05-04 PROCEDURE — 99283 EMERGENCY DEPT VISIT LOW MDM: CPT

## 2022-05-04 PROCEDURE — 6370000000 HC RX 637 (ALT 250 FOR IP)

## 2022-05-04 RX ORDER — IBUPROFEN 800 MG/1
800 TABLET ORAL 3 TIMES DAILY PRN
Qty: 30 TABLET | Refills: 0 | Status: SHIPPED | OUTPATIENT
Start: 2022-05-04 | End: 2022-06-16 | Stop reason: SDUPTHER

## 2022-05-04 RX ORDER — PREDNISONE 50 MG/1
50 TABLET ORAL DAILY
Qty: 5 TABLET | Refills: 0 | Status: SHIPPED | OUTPATIENT
Start: 2022-05-04 | End: 2022-05-09

## 2022-05-04 RX ORDER — PREDNISONE 20 MG/1
40 TABLET ORAL DAILY
Status: DISCONTINUED | OUTPATIENT
Start: 2022-05-04 | End: 2022-05-04 | Stop reason: HOSPADM

## 2022-05-04 RX ORDER — GABAPENTIN 300 MG/1
300 CAPSULE ORAL 2 TIMES DAILY
Qty: 20 CAPSULE | Refills: 0 | Status: SHIPPED | OUTPATIENT
Start: 2022-05-04 | End: 2022-05-09

## 2022-05-04 RX ADMIN — PREDNISONE 40 MG: 20 TABLET ORAL at 18:34

## 2022-05-04 ASSESSMENT — LIFESTYLE VARIABLES
HOW MANY STANDARD DRINKS CONTAINING ALCOHOL DO YOU HAVE ON A TYPICAL DAY: 1 OR 2
HOW OFTEN DO YOU HAVE A DRINK CONTAINING ALCOHOL: NEVER

## 2022-05-04 ASSESSMENT — PAIN SCALES - GENERAL: PAINLEVEL_OUTOF10: 7

## 2022-05-04 ASSESSMENT — PAIN - FUNCTIONAL ASSESSMENT: PAIN_FUNCTIONAL_ASSESSMENT: 0-10

## 2022-05-04 NOTE — ED PROVIDER NOTES
[facility] ED  [address] [facility]  Phone: [phone]      eMERGENCY dEPARTMENT eNCOUnter      Pt Name: Sherren Spell  MRN: [id number]  [place] 1975  Date of evaluation: 5/4/22      CHIEF COMPLAINT:  Chief Complaint   Patient presents with   Anival Giles is a 42 y.o. female who presents with left sided facial pain and left ear pain that started approximately 2 weeks ago. She reports that the pain to the left side of her face and left ear has been progressively getting worse. She states that today the pain has been the worst.  She describes a sharp shooting pain to the left side of her face and ear that she describes as \"somebody stabbing me\". She states that she has been taking ibuprofen at home without relief. She states that nothing seems to make the pain better. She states that palpation over the left side of her face near her temporal/ear and movement of her left jaw makes the pain worse. She states that the pain is so bad at times that it causes dizziness. She denies any recent trauma, falls, fevers, chills, nasal congestion, sore throat, cough, dyspnea, shortness of breath, injury/falls, syncope,       Nursing Notes were reviewed. REVIEW OF SYSTEMS       Constitutional: Denies fever or chills  Eyes: No visual changes. Throat: Left-sided facial pain over TMJ extending over left tragus and distal to temporal area  Ears: Left ear pain  Neck: No neck pain. Respiratory: Denies recent shortness of breath. Cardiac:  Denies recent chest pain. GI:  No abd pain/nausea/vomiting. : Denies dysuria. Musculoskeletal: Denies focal weakness. Neurologic: Denies headache or focal weakness. Skin:  Denies any rash. Negative in 10 essential Systems except as mentioned above and in the HPI.       PAST MEDICAL HISTORY   PMH:  has a past medical history of Allergic rhinitis, DJD of shoulder, Fibromyalgia, and Mixed hyperlipidemia. Surgical History:  has a past surgical history that includes Anterior cruciate ligament repair and Tubal ligation. Social History:  reports that she has been smoking. She has been smoking about 0.50 packs per day. She has never used smokeless tobacco. She reports current alcohol use. She reports that she does not use drugs. Family History: None  Psychiatric History: None    Allergies:has No Known Allergies. PHYSICAL EXAM     INITIAL VITALS: BP (!) 147/110   Pulse 93   Temp 97.4 °F (36.3 °C) (Oral)   Resp 20   Ht 5' 5\" (1.651 m)   Wt 63.5 kg (140 lb)   LMP 05/02/2022   SpO2 99%   BMI 23.30 kg/m²   Constitutional:  Well developed   Eyes:  Pupils equal/round  HENT:  Atraumatic. External ears normal, bilateral TMs clear without bulging,  Nose normal, oropharynx moist. Posterior pharynx is without erythema or exudates, airway is patent, no swelling. Neck- supple, no lymphadenopathy. Tenderness appreciated over left TMJ extending over tragus and distal to temporal area without erythema or ecchymosis noted  Respiratory:  Clear to auscultation bilaterally with good air exchange, no W/R/R  Cardiovascular:  RRR with normal S1 and S2  Musculoskeletal:  Normal to inspection. Integument:  No rash. Neurologic:  Alert, age appropriate mentation/interaction, no focal deficits noted       DIAGNOSTIC RESULTS     RADIOLOGY:   Reviewed the radiologist:  No orders to display     Not indicated      LABS:  Labs Reviewed - No data to display  Not indicated    EMERGENCY DEPARTMENT COURSE:     Patient presents to the emergency room today with complaints as described above. Vital signs are within normal limits although she does appear slightly hypertensive at 147/110. Patient does appear in pain, is tearful, and guarding the left side of her face upon examination. Patient reports intermittent sharp shooting pains in the left side of her face.   I did discuss diagnosis of trigeminal neuralgia with the patient and her friend at bedside based on her symptoms and examination. I will initiate steroid therapy with first dose being in the emergency room prior to discharge. I will also prescribe Motrin 800 mg and give her a short course of gabapentin. Patient has a follow-up appointment scheduled with her primary care physician Dr. Yue Swift on May 10, 2022 patient was advised to call the physician's office tomorrow and let them know that she was seen in the emergency room today and that she needs a sooner follow-up appointment. I have answered her questions and given discharge instructions. She voiced understanding of these instructions and did not have any further questions or complaints. The patient and/or family and I have discussed the diagnosis and risks, and we agree with discharging home to follow-up with their pertinent providers. The patient appears stable for discharge and has been instructed to return immediately for new concerning symptoms or if the symptoms worsen in any way. The patient understands that at this time there is no evidence for a more malignant underlying process, but the patient also understands that early in the process of an illness or injury, an emergency department workup can be falsely reassuring. Routine discharge counseling was given, and the patient understands that worsening, changing or persistent symptoms should prompt an immediate call or follow up with their primary physician or return to the emergency department. I have reviewed the disposition diagnosis with the patient and or their family/guardian. I have answered their questions and given discharge instructions. They voiced understanding of these instructions and did not have any further questions or complaints.        Orders Placed This Encounter   Medications    predniSONE (DELTASONE) 50 MG tablet     Sig: Take 1 tablet by mouth daily for 5 days     Dispense:  5 tablet     Refill:  0    ibuprofen (ADVIL;MOTRIN) 800 MG tablet     Sig: Take 1 tablet by mouth 3 times daily as needed for Pain     Dispense:  30 tablet     Refill:  0    gabapentin (NEURONTIN) 300 MG capsule     Sig: Take 1 capsule by mouth 2 times daily for 10 days. Dispense:  20 capsule     Refill:  0    predniSONE (DELTASONE) tablet 40 mg       CONSULTS:  None      FINAL IMPRESSION      1. Trigeminal neuralgia of left side of face          DISPOSITION/PLAN:  DISPOSITION Decision To Discharge 05/04/2022 05:59:23 PM        PATIENT REFERRED TO:  Sonu Lorenzo MD  [facility] [address] [address] [phone]    Call       [facility] ED  / [place]   [phone]    As needed      DISCHARGE MEDICATIONS:  New Prescriptions    GABAPENTIN (NEURONTIN) 300 MG CAPSULE    Take 1 capsule by mouth 2 times daily for 10 days.     IBUPROFEN (ADVIL;MOTRIN) 800 MG TABLET    Take 1 tablet by mouth 3 times daily as needed for Pain    PREDNISONE (DELTASONE) 50 MG TABLET    Take 1 tablet by mouth daily for 5 days       (Please note that portions of this note were completed with a voice recognition program.  Efforts were made to edit the dictations but occasionally words are mis-transcribed.)    GERARDO Chandler CNP, APRN - CNP  05/04/22 1954

## 2022-05-04 NOTE — ED PROVIDER NOTES
[address] ED  eMERGENCY dEPARTMENT eNCOUnter   Independent Attestation     Pt Name: Avelino Milton  MRN: [id number]  [place] 1975  Date of evaluation: 5/4/22       Avelino Milton is a 42 y.o. female who presents with Otalgia        Based on the medical record, the care appears appropriate. I was personally available for consultation in the Emergency Department.     Navjot Quezada DO  Attending Emergency  Physician                 Navjot Quezada DO  05/04/22 1756

## 2022-05-04 NOTE — ED TRIAGE NOTES
Pt c/o L ear pain, present for 2 weeks, increased in intensity today, pain present in L jaw- increases with movement

## 2022-05-11 ENCOUNTER — CLINICAL DOCUMENTATION (OUTPATIENT)
Dept: BEHAVIORAL/MENTAL HEALTH CLINIC | Age: 47
End: 2022-05-11

## 2022-05-11 NOTE — PROGRESS NOTES
Warm handoff requested by Margie Olmstead MD and was completed. Patient scheduled for future face to face visit.  Scheduled for tomorrow 5/12 @ 8 AM.

## 2022-05-12 ENCOUNTER — OFFICE VISIT (OUTPATIENT)
Dept: BEHAVIORAL/MENTAL HEALTH CLINIC | Age: 47
End: 2022-05-12
Payer: COMMERCIAL

## 2022-05-12 DIAGNOSIS — F32.3 CURRENT SEVERE EPISODE OF MAJOR DEPRESSIVE DISORDER WITH PSYCHOTIC FEATURES WITHOUT PRIOR EPISODE (HCC): Primary | ICD-10-CM

## 2022-05-12 PROCEDURE — 90791 PSYCH DIAGNOSTIC EVALUATION: CPT | Performed by: SOCIAL WORKER
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
## 2022-05-15 ENCOUNTER — APPOINTMENT (OUTPATIENT)
Dept: CT IMAGING | Age: 47
DRG: 885 | End: 2022-05-15
Payer: COMMERCIAL

## 2022-05-15 ENCOUNTER — HOSPITAL ENCOUNTER (INPATIENT)
Age: 47
LOS: 3 days | Discharge: HOME | DRG: 885 | End: 2022-05-18
Attending: EMERGENCY MEDICINE | Admitting: PSYCHIATRY & NEUROLOGY
Payer: COMMERCIAL

## 2022-05-15 DIAGNOSIS — F23 ACUTE PSYCHOSIS (HCC): Primary | ICD-10-CM

## 2022-05-15 LAB
ABSOLUTE EOS #: 0.2 K/UL (ref 0–0.4)
ABSOLUTE LYMPH #: 2.6 K/UL (ref 1–4.8)
ABSOLUTE MONO #: 1.4 K/UL (ref 0.1–1.3)
AMPHETAMINE SCREEN URINE: NEGATIVE
ANION GAP SERPL CALCULATED.3IONS-SCNC: 13 MMOL/L (ref 9–17)
BARBITURATE SCREEN URINE: NEGATIVE
BASOPHILS # BLD: 1 % (ref 0–2)
BASOPHILS ABSOLUTE: 0.1 K/UL (ref 0–0.2)
BENZODIAZEPINE SCREEN, URINE: NEGATIVE
BUN BLDV-MCNC: 11 MG/DL (ref 6–20)
CALCIUM SERPL-MCNC: 8.8 MG/DL (ref 8.6–10.4)
CANNABINOID SCREEN URINE: NEGATIVE
CHLORIDE BLD-SCNC: 106 MMOL/L (ref 98–107)
CO2: 20 MMOL/L (ref 20–31)
COCAINE METABOLITE, URINE: POSITIVE
CREAT SERPL-MCNC: 0.7 MG/DL (ref 0.5–0.9)
EOSINOPHILS RELATIVE PERCENT: 1 % (ref 0–4)
ETHANOL PERCENT: <0.01 %
ETHANOL: <10 MG/DL
GFR AFRICAN AMERICAN: >60 ML/MIN
GFR NON-AFRICAN AMERICAN: >60 ML/MIN
GFR SERPL CREATININE-BSD FRML MDRD: ABNORMAL ML/MIN/{1.73_M2}
GLUCOSE BLD-MCNC: 134 MG/DL (ref 70–99)
HCG QUALITATIVE: NEGATIVE
HCT VFR BLD CALC: 34.2 % (ref 36–46)
HEMOGLOBIN: 12 G/DL (ref 12–16)
LYMPHOCYTES # BLD: 17 % (ref 24–44)
MAGNESIUM: 1.9 MG/DL (ref 1.6–2.6)
MCH RBC QN AUTO: 31.5 PG (ref 26–34)
MCHC RBC AUTO-ENTMCNC: 35.2 G/DL (ref 31–37)
MCV RBC AUTO: 89.6 FL (ref 80–100)
METHADONE SCREEN, URINE: NEGATIVE
MONOCYTES # BLD: 9 % (ref 1–7)
OPIATES, URINE: NEGATIVE
OXYCODONE SCREEN URINE: NEGATIVE
PDW BLD-RTO: 11.9 % (ref 11.5–14.9)
PHENCYCLIDINE, URINE: NEGATIVE
PLATELET # BLD: 253 K/UL (ref 150–450)
PMV BLD AUTO: 8.4 FL (ref 6–12)
POTASSIUM SERPL-SCNC: 3.5 MMOL/L (ref 3.7–5.3)
RBC # BLD: 3.81 M/UL (ref 4–5.2)
SEG NEUTROPHILS: 72 % (ref 36–66)
SEGMENTED NEUTROPHILS ABSOLUTE COUNT: 11.2 K/UL (ref 1.3–9.1)
SODIUM BLD-SCNC: 139 MMOL/L (ref 135–144)
TEST INFORMATION: ABNORMAL
WBC # BLD: 15.5 K/UL (ref 3.5–11)

## 2022-05-15 PROCEDURE — 84703 CHORIONIC GONADOTROPIN ASSAY: CPT

## 2022-05-15 PROCEDURE — 6370000000 HC RX 637 (ALT 250 FOR IP): Performed by: NURSE PRACTITIONER

## 2022-05-15 PROCEDURE — 90792 PSYCH DIAG EVAL W/MED SRVCS: CPT | Performed by: PSYCHIATRY & NEUROLOGY

## 2022-05-15 PROCEDURE — 1240000000 HC EMOTIONAL WELLNESS R&B

## 2022-05-15 PROCEDURE — 6370000000 HC RX 637 (ALT 250 FOR IP): Performed by: PSYCHIATRY & NEUROLOGY

## 2022-05-15 PROCEDURE — APPSS180 APP SPLIT SHARED TIME > 60 MINUTES: Performed by: NURSE PRACTITIONER

## 2022-05-15 PROCEDURE — 85025 COMPLETE CBC W/AUTO DIFF WBC: CPT

## 2022-05-15 PROCEDURE — 83735 ASSAY OF MAGNESIUM: CPT

## 2022-05-15 PROCEDURE — 80048 BASIC METABOLIC PNL TOTAL CA: CPT

## 2022-05-15 PROCEDURE — 6360000002 HC RX W HCPCS: Performed by: EMERGENCY MEDICINE

## 2022-05-15 PROCEDURE — 93005 ELECTROCARDIOGRAM TRACING: CPT | Performed by: EMERGENCY MEDICINE

## 2022-05-15 PROCEDURE — 36415 COLL VENOUS BLD VENIPUNCTURE: CPT

## 2022-05-15 PROCEDURE — 96372 THER/PROPH/DIAG INJ SC/IM: CPT

## 2022-05-15 PROCEDURE — G0480 DRUG TEST DEF 1-7 CLASSES: HCPCS

## 2022-05-15 PROCEDURE — 70450 CT HEAD/BRAIN W/O DYE: CPT

## 2022-05-15 PROCEDURE — 80307 DRUG TEST PRSMV CHEM ANLYZR: CPT

## 2022-05-15 PROCEDURE — 99223 1ST HOSP IP/OBS HIGH 75: CPT | Performed by: INTERNAL MEDICINE

## 2022-05-15 PROCEDURE — 99285 EMERGENCY DEPT VISIT HI MDM: CPT

## 2022-05-15 RX ORDER — HALOPERIDOL 5 MG/ML
5 INJECTION INTRAMUSCULAR ONCE
Status: COMPLETED | OUTPATIENT
Start: 2022-05-15 | End: 2022-05-15

## 2022-05-15 RX ORDER — RISPERIDONE 1 MG/1
1 TABLET, FILM COATED ORAL 2 TIMES DAILY
Status: DISCONTINUED | OUTPATIENT
Start: 2022-05-15 | End: 2022-05-18 | Stop reason: HOSPADM

## 2022-05-15 RX ORDER — TRAZODONE HYDROCHLORIDE 50 MG/1
50 TABLET ORAL NIGHTLY PRN
Status: DISCONTINUED | OUTPATIENT
Start: 2022-05-15 | End: 2022-05-18 | Stop reason: HOSPADM

## 2022-05-15 RX ORDER — IBUPROFEN 400 MG/1
400 TABLET ORAL EVERY 6 HOURS PRN
Status: DISCONTINUED | OUTPATIENT
Start: 2022-05-15 | End: 2022-05-18 | Stop reason: HOSPADM

## 2022-05-15 RX ORDER — BUSPIRONE HYDROCHLORIDE 10 MG/1
10 TABLET ORAL 3 TIMES DAILY PRN
Status: DISCONTINUED | OUTPATIENT
Start: 2022-05-15 | End: 2022-05-15

## 2022-05-15 RX ORDER — LORAZEPAM 1 MG/1
2 TABLET ORAL EVERY 6 HOURS PRN
Status: DISCONTINUED | OUTPATIENT
Start: 2022-05-15 | End: 2022-05-18 | Stop reason: HOSPADM

## 2022-05-15 RX ORDER — POTASSIUM CHLORIDE 20 MEQ/1
40 TABLET, EXTENDED RELEASE ORAL ONCE
Status: DISCONTINUED | OUTPATIENT
Start: 2022-05-15 | End: 2022-05-18 | Stop reason: HOSPADM

## 2022-05-15 RX ORDER — POLYETHYLENE GLYCOL 3350 17 G/17G
17 POWDER, FOR SOLUTION ORAL DAILY PRN
Status: DISCONTINUED | OUTPATIENT
Start: 2022-05-15 | End: 2022-05-18 | Stop reason: HOSPADM

## 2022-05-15 RX ORDER — DIPHENHYDRAMINE HYDROCHLORIDE 50 MG/ML
50 INJECTION INTRAMUSCULAR; INTRAVENOUS EVERY 6 HOURS PRN
Status: DISCONTINUED | OUTPATIENT
Start: 2022-05-15 | End: 2022-05-18 | Stop reason: HOSPADM

## 2022-05-15 RX ORDER — BUSPIRONE HYDROCHLORIDE 10 MG/1
10 TABLET ORAL 3 TIMES DAILY
Status: DISCONTINUED | OUTPATIENT
Start: 2022-05-15 | End: 2022-05-18 | Stop reason: HOSPADM

## 2022-05-15 RX ORDER — HYDROXYZINE 50 MG/1
50 TABLET, FILM COATED ORAL 3 TIMES DAILY PRN
Status: DISCONTINUED | OUTPATIENT
Start: 2022-05-15 | End: 2022-05-18 | Stop reason: HOSPADM

## 2022-05-15 RX ORDER — LISINOPRIL 20 MG/1
20 TABLET ORAL DAILY
Status: DISCONTINUED | OUTPATIENT
Start: 2022-05-15 | End: 2022-05-16

## 2022-05-15 RX ORDER — ALBUTEROL SULFATE 90 UG/1
2 AEROSOL, METERED RESPIRATORY (INHALATION) EVERY 6 HOURS PRN
Status: DISCONTINUED | OUTPATIENT
Start: 2022-05-15 | End: 2022-05-18 | Stop reason: HOSPADM

## 2022-05-15 RX ORDER — HALOPERIDOL 5 MG
5 TABLET ORAL EVERY 6 HOURS PRN
Status: DISCONTINUED | OUTPATIENT
Start: 2022-05-15 | End: 2022-05-18 | Stop reason: HOSPADM

## 2022-05-15 RX ORDER — ACETAMINOPHEN 325 MG/1
650 TABLET ORAL EVERY 6 HOURS PRN
Status: DISCONTINUED | OUTPATIENT
Start: 2022-05-15 | End: 2022-05-18 | Stop reason: HOSPADM

## 2022-05-15 RX ORDER — LORAZEPAM 2 MG/ML
2 INJECTION INTRAMUSCULAR EVERY 6 HOURS PRN
Status: DISCONTINUED | OUTPATIENT
Start: 2022-05-15 | End: 2022-05-18 | Stop reason: HOSPADM

## 2022-05-15 RX ORDER — MAGNESIUM HYDROXIDE/ALUMINUM HYDROXICE/SIMETHICONE 120; 1200; 1200 MG/30ML; MG/30ML; MG/30ML
30 SUSPENSION ORAL EVERY 6 HOURS PRN
Status: DISCONTINUED | OUTPATIENT
Start: 2022-05-15 | End: 2022-05-18 | Stop reason: HOSPADM

## 2022-05-15 RX ORDER — HALOPERIDOL 5 MG/ML
5 INJECTION INTRAMUSCULAR EVERY 6 HOURS PRN
Status: DISCONTINUED | OUTPATIENT
Start: 2022-05-15 | End: 2022-05-18 | Stop reason: HOSPADM

## 2022-05-15 RX ADMIN — RISPERIDONE 1 MG: 1 TABLET ORAL at 22:18

## 2022-05-15 RX ADMIN — HALOPERIDOL LACTATE 5 MG: 5 INJECTION, SOLUTION INTRAMUSCULAR at 03:27

## 2022-05-15 RX ADMIN — LISINOPRIL 20 MG: 20 TABLET ORAL at 16:32

## 2022-05-15 RX ADMIN — BUSPIRONE HYDROCHLORIDE 10 MG: 10 TABLET ORAL at 22:17

## 2022-05-15 RX ADMIN — BUSPIRONE HYDROCHLORIDE 10 MG: 10 TABLET ORAL at 16:32

## 2022-05-15 ASSESSMENT — LIFESTYLE VARIABLES
HOW OFTEN DO YOU HAVE A DRINK CONTAINING ALCOHOL: NEVER
HOW MANY STANDARD DRINKS CONTAINING ALCOHOL DO YOU HAVE ON A TYPICAL DAY: PATIENT DECLINED

## 2022-05-15 ASSESSMENT — PAIN - FUNCTIONAL ASSESSMENT: PAIN_FUNCTIONAL_ASSESSMENT: NONE - DENIES PAIN

## 2022-05-15 ASSESSMENT — SLEEP AND FATIGUE QUESTIONNAIRES
DO YOU HAVE DIFFICULTY SLEEPING: COMMENT
DO YOU USE A SLEEP AID: COMMENT
AVERAGE NUMBER OF SLEEP HOURS: 0

## 2022-05-15 ASSESSMENT — PATIENT HEALTH QUESTIONNAIRE - PHQ9: SUM OF ALL RESPONSES TO PHQ QUESTIONS 1-9: 0

## 2022-05-15 ASSESSMENT — PAIN SCALES - GENERAL: PAINLEVEL_OUTOF10: 4

## 2022-05-15 NOTE — BH NOTE
Patient given tobacco quitline number 29903363692 at this time, refusing to call at this time, states \" I just dont want to quit now\"- patient given information as to the dangers of long term tobacco use. Continue to reinforce the importance of tobacco cessation.

## 2022-05-15 NOTE — BH NOTE
[address] [facility]  Admission Note     Admission Type:   Admission Type: Involuntary    Reason for admission:  Reason for Admission: Brought in by police for bizarre behaviors. PATIENT STRENGTHS:       Patient Strengths and Limitations:       Addictive Behavior:   Addictive Behavior  In the Past 3 Months, Have You Felt or Has Someone Told You That You Have a Problem With  : None    Medical Problems:   Past Medical History:   Diagnosis Date    Allergic rhinitis 7/28/2015    DJD of shoulder     bilateral    Fibromyalgia     Mixed hyperlipidemia 10/1/2018       Status EXAM:  Mental Status and Behavioral Exam  Normal: No  Level of Assistance: Independent/Self  Facial Expression: Avoids Gaze  Affect: Blunt  Level of Consciousness: Lethargic  Frequency of Checks: 4 times per hour, close  Mood:Normal: Yes  Motor Activity:Normal: Yes  Eye Contact: Fair  Observed Behavior: Cooperative,Guarded  Sexual Misconduct History: Current - no  Preception: [place] to person  Attention:Normal: No  Attention: Distractible  Thought Processes: Blocking  Thought Content:Normal: No  Thought Content: Poverty of content  Depression Symptoms: No problems reported or observed. Anxiety Symptoms: Generalized  Li Symptoms: No problems reported or observed.   Hallucinations: Unable to assess  Delusions: No (UEN)  Memory:Normal: Yes  Insight and Judgment: No  Insight and Judgment: Poor judgment,Poor insight    Tobacco Screening:  Practical Counseling, on admission, hong X, if applicable and completed (first 3 are required if patient doesn't refuse):            ( )  Recognizing danger situations (included triggers and roadblocks)                    ( )  Coping skills (new ways to manage stress, exercise, relaxation techniques, changing routine, distraction)                                                           ( )  Basic information about quitting (benefits of quitting, techniques in how to quit, available resources  ( ) Referral for counseling faxed to [facility]                                           (x ) Patient refused counseling  ( ) Patient has not smoked in the last 30 days    Metabolic Screening:    No results found for: LABA1C    Lab Results   Component Value Date    CHOL 185 09/29/2018     Lab Results   Component Value Date    TRIG 133 09/29/2018     Lab Results   Component Value Date    HDL 46 09/29/2018     No components found for: LDLCAL  No results found for: LABVLDL      Body mass index is 21.63 kg/m². BP Readings from Last 2 Encounters:   05/15/22 113/63   05/11/22 110/78           Pt admitted with followings belongings:  Dental Appliances: At home  Vision - Corrective Lenses: Eyeglasses  Hearing Aid: None  Jewelry: Earrings  Body Piercings Removed: No  Clothing: Jacket/Coat,Pants,Shirt,Socks,Undergarments,Belt  Other Valuables: Money,Keys,Wallet,Lighter/Matches     Patient's home medications need verified. Patient oriented to surroundings and program expectations and copy of patient rights given. Received admission packet:  yes. Consents reviewed, signed no. Refused yes. Patient verbalize understanding:  yes. Patient education on precautions: yes    Pt admitted from [facility] after being brought in by police for bizarre behavior and waving gun outside home. Police were able to get gun from pt and pt was brought in for evaluation. Pt unaware of reason for admit during admission process and states she doesn't understand why she is here. Pt wanded and changed into hospital attire for safety.                    Allie Holter, RN
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
## 2022-05-15 NOTE — PLAN OF CARE
Problem: Anxiety  Goal: Will report anxiety at manageable levels  Description: INTERVENTIONS:  1. Administer medication as ordered  2. Teach and rehearse alternative coping skills  3. Provide emotional support with 1:1 interaction with staff  Outcome: Progressing     Problem: Behavior  Goal: Pt/Family maintain appropriate behavior and adhere to behavioral management agreement, if implemented  Description: INTERVENTIONS:  1. Assess patient/family's coping skills and  non-compliant behavior (including use of illegal substances)  2. Notify security of behavior or suspected illegal substances which indicate the need for search of the patient and/or belongings  3. Encourage verbalization of thoughts and concerns in a socially appropriate manner  4. Utilize positive, consistent limit setting strategies supporting safety of patient, staff and others  5. Encourage participation in the decision making process about the behavioral management agreement  6. Implement a Health Care Agreement if patient meets criteria  7. If a patient's behavior jeopardizes the safety of the patient, staff, or others refer to organization policy. If a visitor's behavior poses a threat to safety call refer to organization policy. 8. Initiate consult with , Psychosocial CNS, Spiritual Care as appropriate  Outcome: Progressing     Patient denies thoughts of self harm during this shift. Patient remains isolative to room during this shift and is sleeping. Patient had little nutritional intake during this shift. Patient interacts minimally with staff during assessment, however remains cooperative. Q15 minute and random safety checks maintained.

## 2022-05-15 NOTE — H&P
[facility] Internal Medicine  Macrina Thompson MD; Elias Baldwin MD; Bharti Mayen MD; MD Norma Granda MD; MD WAY[facility]   [facility]    HISTORY AND PHYSICAL EXAMINATION            Date:   5/15/2022  Patient name:  Avelino Milton  Date of admission:  5/15/2022  1:08 AM  MRN:   [id number]  Account:  [de-identified]  YOB: 1975  PCP:    Sharonda Cuellar MD  Room:   [address] [place], [place] [id number]  Code Status:    Full Code    Chief Complaint:     Chief Complaint   Patient presents with    Suicidal   htn      History Obtained From:     Pt medical record    History of Present Illness:     Avelino Milton is a 42 y.o. Non- / non  female who presents with Suicidal   and is admitted to the hospital for the management of Acute psychosis ([facility] [address] HTN  Onset more than 2 years ago  jessenia mild to mod  Controlled with current po meds  Not associated with headaches or blurry vision  No chest pain        Past Medical History:     Past Medical History:   Diagnosis Date    Allergic rhinitis 7/28/2015    DJD of shoulder     bilateral    Fibromyalgia     Mixed hyperlipidemia 10/1/2018        Past Surgical History:     Past Surgical History:   Procedure Laterality Date    ANTERIOR CRUCIATE LIGAMENT REPAIR      Rt knee    TUBAL LIGATION          Medications Prior to Admission:     Prior to Admission medications    Medication Sig Start Date End Date Taking?  Authorizing Provider   mirtazapine (REMERON) 30 MG tablet Take 1 tablet by mouth nightly 5/11/22   Sharonda Cuellar MD   cyclobenzaprine (FLEXERIL) 10 MG tablet Take 1 tablet by mouth 3 times daily as needed for Muscle spasms 5/11/22 6/10/22  Sharonda Cuellar MD   busPIRone (BUSPAR) 10 MG tablet Take 1 tablet by mouth 3 times daily as needed (panic attacks) 5/11/22 6/10/22  Sharonda Cuellar MD   ibuprofen (ADVIL;MOTRIN) 800 MG tablet Take 1 tablet by mouth 3 times daily as needed for Pain 5/4/22   GERARDO Yao - CNP   albuterol sulfate  (90 Base) MCG/ACT inhaler Inhale 2 puffs into the lungs every 6 hours as needed for Wheezing 4/18/22   GERARDO Jimenez CNP   lisinopril (PRINIVIL;ZESTRIL) 20 MG tablet TAKE 1 TABLET BY MOUTH DAILY FOR HIGH BLOOD PRESSURE 10/27/21   Zoe Cueto MD   tiZANidine (ZANAFLEX) 4 MG tablet Take 1 tablet by mouth every 8 hours as needed (muscle spasms)  Patient not taking: Reported on 5/15/2022 10/27/21   Zoe Cueto MD   Menthol, Topical Analgesic, (ICY HOT EX) Apply topically Patient using the spray    Historical Provider, MD        Allergies:     Patient has no known allergies. Social History:     Tobacco:    reports that she has been smoking cigarettes. She has been smoking about 0.50 packs per day. She has never used smokeless tobacco.  Alcohol:      reports current alcohol use. Drug Use:  reports current drug use. Drug: Cocaine. Family History:     Family History   Problem Relation Age of Onset    Diabetes Mother     Hypertension Mother        Review of Systems:     Positive and Negative as described in HPI.     CONSTITUTIONAL:  negative for fevers, chills, sweats, fatigue, weight loss  HEENT:  negative for vision, hearing changes, runny nose, throat pain  RESPIRATORY:  negative for shortness of breath, cough, congestion, wheezing  CARDIOVASCULAR:  negative for chest pain, palpitations  GASTROINTESTINAL:  negative for nausea, vomiting, diarrhea, constipation, change in bowel habits, abdominal pain   GENITOURINARY:  negative for difficulty of urination, burning with urination, frequency   INTEGUMENT:  negative for rash, skin lesions, easy bruising   HEMATOLOGIC/LYMPHATIC:  negative for swelling/edema   ALLERGIC/IMMUNOLOGIC:  negative for urticaria , itching  ENDOCRINE:  negative increase in drinking, increase in urination, hot or cold intolerance  MUSCULOSKELETAL:  negative joint pains, muscle aches, swelling of joints  NEUROLOGICAL:  negative for headaches, dizziness, lightheadedness, numbness, pain, tingling extremities      Physical Exam:   /63   Pulse 73   Temp 98.6 °F (37 °C)   Resp 16   Ht 5' 5\" (1.651 m)   Wt 130 lb (59 kg)   LMP 2022   SpO2 100%   BMI 21.63 kg/m²   Temp (24hrs), Av.6 °F (37 °C), Min:98.6 °F (37 °C), Max:98.6 °F (37 °C)    No results for input(s): POCGLU in the last 72 hours.   No intake or output data in the 24 hours ending 05/15/22 1414    General Appearance: alert, well appearing, and in no acute distress  Mental status: oriented to person, place, and time  Head: normocephalic, atraumatic  Eye: no icterus, redness, pupils equal and reactive, extraocular eye movements intact, conjunctiva clear  Ear: normal external ear, no discharge, hearing intact  Nose: no drainage noted  Mouth: mucous membranes moist  Neck: supple, no carotid bruits, thyroid not palpable  Lungs: Bilateral equal air entry, clear to ausculation, no wheezing, rales or rhonchi, normal effort  Cardiovascular: normal rate, regular rhythm, no murmur, gallop, rub  Abdomen: Soft, nontender, nondistended, normal bowel sounds, no hepatomegaly or splenomegaly  Neurologic: There are no new focal motor or sensory deficits, normal muscle tone and bulk, no abnormal sensation, normal speech, cranial nerves II through XII grossly intact  Skin: No gross lesions, rashes, bruising or bleeding on exposed skin area  Extremities: peripheral pulses palpable, no pedal edema or calf pain with palpation      Investigations:      Laboratory Testing:  Recent Results (from the past 24 hour(s))   Basic Metabolic Panel w/ Reflex to MG    Collection Time: 05/15/22  2:07 AM   Result Value Ref Range    Glucose 134 (H) 70 - 99 mg/dL    BUN 11 6 - 20 mg/dL    CREATININE 0.70 0.50 - 0.90 mg/dL    Calcium 8.8 8.6 - 10.4 mg/dL    Sodium 139 135 - 144 mmol/L    Potassium 3.5 (L) 3.7 - 5.3 mmol/L    Chloride 106 98 - 107 mmol/L    CO2 20 20 - 31 mmol/L    Anion Gap 13 9 - 17 mmol/L    GFR Non-African American >60 >60 mL/min    GFR African American >60 >60 mL/min    GFR Comment         CBC with Auto Differential    Collection Time: 05/15/22  2:07 AM   Result Value Ref Range    WBC 15.5 (H) 3.5 - 11.0 k/uL    RBC 3.81 (L) 4.0 - 5.2 m/uL    Hemoglobin 12.0 12.0 - 16.0 g/dL    Hematocrit 34.2 (L) 36 - 46 %    MCV 89.6 80 - 100 fL    MCH 31.5 26 - 34 pg    MCHC 35.2 31 - 37 g/dL    RDW 11.9 11.5 - 14.9 %    Platelets 739 764 - 888 k/uL    MPV 8.4 6.0 - 12.0 fL    Seg Neutrophils 72 (H) 36 - 66 %    Lymphocytes 17 (L) 24 - 44 %    Monocytes 9 (H) 1 - 7 %    Eosinophils % 1 0 - 4 %    Basophils 1 0 - 2 %    Segs Absolute 11.20 (H) 1.3 - 9.1 k/uL    Absolute Lymph # 2.60 1.0 - 4.8 k/uL    Absolute Mono # 1.40 (H) 0.1 - 1.3 k/uL    Absolute Eos # 0.20 0.0 - 0.4 k/uL    Basophils Absolute 0.10 0.0 - 0.2 k/uL   Ethanol    Collection Time: 05/15/22  2:07 AM   Result Value Ref Range    Ethanol <10 <10 mg/dL    Ethanol percent <0.010 %   HCG Screen, Blood    Collection Time: 05/15/22  2:07 AM   Result Value Ref Range    hCG Qual NEGATIVE NEGATIVE   Magnesium    Collection Time: 05/15/22  2:07 AM   Result Value Ref Range    Magnesium 1.9 1.6 - 2.6 mg/dL   URINE DRUG SCREEN    Collection Time: 05/15/22  2:53 AM   Result Value Ref Range    Amphetamine Screen, Ur NEGATIVE NEGATIVE    Barbiturate Screen, Ur NEGATIVE NEGATIVE    Benzodiazepine Screen, Urine NEGATIVE NEGATIVE    Cocaine Metabolite, Urine POSITIVE (A) NEGATIVE    Methadone Screen, Urine NEGATIVE NEGATIVE    Opiates, Urine NEGATIVE NEGATIVE    Phencyclidine, Urine NEGATIVE NEGATIVE    Cannabinoid Scrn, Ur NEGATIVE NEGATIVE    Oxycodone Screen, Ur NEGATIVE NEGATIVE    Test Information       Assay provides medical screening only. The absence of expected drug(s) and/or metabolite(s) may indicate diluted or adulterated urine, limitations of testing or timing of collection. Imaging/Diagnostics:  CT HEAD WO CONTRAST    Result Date: 5/15/2022  No acute intracranial abnormality. Assessment :      Hospital Problems           Last Modified POA    * (Principal) Acute psychosis ([address] 5/15/2022 Yes    Mixed hyperlipidemia 5/15/2022 Yes          Plan:     51-year-old lady with a history of hypertension start lisinopril 20 mg  Fibromyalgia on Flexeril 10 mg  Hypokalemia potassium 3.5 replace 40 M EQ orally 1 time        Rojas Miguel MD  5/15/2022  2:14 PM    Copy sent to Dr. Rita Kaminski MD    Please note that this chart was generated using voice recognition Dragon dictation software. Although every effort was made to ensure the accuracy of this automated transcription, some errors in transcription may have occurred.

## 2022-05-15 NOTE — CARE COORDINATION
[facility] Biopsychosocial Assessment    Current Level of Psychosocial Functioning     Independent   Dependent  X  Minimal Assist         Psychosocial High Risk Factors (check all that apply)    Unable to obtain meds   Chronic illness/pain    Substance abuse X Crack/Cocaine, Alcohol   Lack of Family Support X  Financial stress X  Isolation X  Inadequate Community Resources  Suicide attempt(s)   Not taking medications   Victim of crime   Developmental Delay  Unable to manage personal needs  X  Age 72 or older   Homeless   No transportation   Readmission within 30 days  Unemployment  Traumatic Event      Psychiatric Advanced Directives: none reported     Family to Involve in Treatment:  Lack of family support     Sexual Orientation:  EUN    Patient Strengths: insurance,  follows up with PCP for medication management     Patient Barriers: presenting on admission with bizarre behaviors, substance abuse    Opiate Education Provided:N/A Pt denies and does not have a documented history of Opiate or Heroin use/abuse. Pt reports daily Alcohol use and Crack/Cocaine abuse. CMHC/mental health history: Pt reports that she follows up with her PCP Dr Vonnie Severino MD for medication management     Plan of Care   medication management, group/individual therapies, family meetings, psycho -education, treatment team meetings to assist with stabilization    Initial Discharge Plan:  Pt reports a plan to continue following up with her PCP Vonnie Severino MD for medications at discharge. Clinical Summary: Carlton Brooke is a 42 y.o. female who presented on admission by [facility]  for agitated and bizarre behavior. Noted that patient was waving a gun in the ED stating that she had thoughts to hurt herself. Patient required emergency medications prior to admission to unit.     At time of assessment, patient is irritable and persecutory.   She is thought disordered and illogical.  Patient states that she needs to contact the FBI as people have been following her. Believes that people that are after her and have been hacking her phone as well as her home security system. She states that she has not been sleeping much lately secondary to these concerns and does not feel safe. States that she would do what ever is necessary in order to protect herself and has had thoughts to end her life recently. .  Patient displays poor insight into the reason for her admission or her need for treatment.   Pt does endorse Alcohol and Crack/Cocaine abuse. Pt reports that she has recently been staying in a hotel. Pt was very guarded during assessment and gave limited information.  Pt reports that she follows up with her PCP Dr Armando Hagen MD for medication management
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
## 2022-05-15 NOTE — ED NOTES
Spoke with Dr. Dayne Bishop about potential admittance to [facility] for patient.  He is requesting a head CT and EKG at this time and will reevaluate when results are final.     Michele Bacon RN  05/15/22 [id number]

## 2022-05-16 PROBLEM — I10 ESSENTIAL HYPERTENSION: Status: ACTIVE | Noted: 2022-05-16

## 2022-05-16 LAB
EKG ATRIAL RATE: 77 BPM
EKG P AXIS: 60 DEGREES
EKG P-R INTERVAL: 122 MS
EKG Q-T INTERVAL: 436 MS
EKG QRS DURATION: 86 MS
EKG QTC CALCULATION (BAZETT): 493 MS
EKG R AXIS: 60 DEGREES
EKG T AXIS: 44 DEGREES
EKG VENTRICULAR RATE: 77 BPM

## 2022-05-16 PROCEDURE — 1240000000 HC EMOTIONAL WELLNESS R&B

## 2022-05-16 PROCEDURE — 99232 SBSQ HOSP IP/OBS MODERATE 35: CPT | Performed by: PSYCHIATRY & NEUROLOGY

## 2022-05-16 PROCEDURE — APPSS15 APP SPLIT SHARED TIME 0-15 MINUTES: Performed by: NURSE PRACTITIONER

## 2022-05-16 PROCEDURE — 99232 SBSQ HOSP IP/OBS MODERATE 35: CPT | Performed by: INTERNAL MEDICINE

## 2022-05-16 PROCEDURE — 6370000000 HC RX 637 (ALT 250 FOR IP): Performed by: PSYCHIATRY & NEUROLOGY

## 2022-05-16 PROCEDURE — 6370000000 HC RX 637 (ALT 250 FOR IP): Performed by: NURSE PRACTITIONER

## 2022-05-16 RX ADMIN — IBUPROFEN 400 MG: 400 TABLET ORAL at 21:41

## 2022-05-16 RX ADMIN — BUSPIRONE HYDROCHLORIDE 10 MG: 10 TABLET ORAL at 09:52

## 2022-05-16 RX ADMIN — RISPERIDONE 1 MG: 1 TABLET ORAL at 09:52

## 2022-05-16 ASSESSMENT — PAIN SCALES - GENERAL: PAINLEVEL_OUTOF10: 4

## 2022-05-16 ASSESSMENT — PAIN DESCRIPTION - LOCATION: LOCATION: OTHER (COMMENT)

## 2022-05-16 ASSESSMENT — PAIN DESCRIPTION - DESCRIPTORS: DESCRIPTORS: SORE

## 2022-05-16 NOTE — PROGRESS NOTES
Daily Progress Note  5/16/2022    Patient Name: Brenna Leung    CHIEF COMPLAINT: Acute psychosis         SUBJECTIVE:      Patient is seen today for a follow up assessment. Staff reports that patient has been irritable with hypotension today. It is unclear if she is withdrawing from any substances. They report that she is refusing to shower and seems overall confused rather than actually attending to internal stimuli. She is interviewed today bedside, she is oriented to self however required orientation to her current location. When told she was at [facility] she was able to identify that it is on [facility] side\". She is unsure what led to her hospitalization, she does identify feeling confused with difficulty organizing her thoughts. She becomes irritable with this Onalee Coad and states \"I know you are trying to talk to me all calm but it is annoying\". She denies any outright auditory or visual hallucinations though does continue to verbalize that it is difficult to describe how she is feeling. She denies any withdrawal from illicit substance use. She endorses history of depression but denies any current thoughts of self-harm. We discussed her medication regimen, she verbalizes that she did not realize she was taking risperidone and feels that she does not need this medication. Staff reports this afternoon that she did refuse her BuSpar.     Appetite:  [x] Adequate/Unchanged  [] Increased  [] Decreased      Sleep:       [x] Adequate/Unchanged  [] Fair  [] Poor      Group Attendance on Unit:   [] Yes   [] Selectively    [x] No    Compliant with scheduled medications: [] Yes  [x] No    Received emergency medications in past 24 hrs: [x] Yes   [] No    Medication Side Effects: Denies          Mental Status Exam  Level of consciousness: Alert and awake   Appearance: Appropriate attire for setting, seated on bed, with poor grooming and hygiene   Behavior/Motor: Somewhat approachable, her head is slumped over facing her lap, at times almost touching her lap during the conversation  Attitude toward examiner: Somewhat cooperative, mostly inattentive, no eye contact  Speech: Slow, delayed, irritable tone  Mood: \"I do not know\"  Affect: Guarded, defensive, irritable  Thought processes: Delayed, thought blocking evident, somewhat disorganized  Thought content: Denies homicidal ideation  Suicidal Ideation: Denies suicidal ideations   Delusions: Paranoid  Perceptual Disturbance: Denies, patient does not appear to be responding to internal stimuli. Cognition: Oriented to person only, had to be oriented to her location and general circumstance  Memory: Impaired  Insight: poor   Judgement: poor       Data   height is 5' 5\" (1.651 m) and weight is 130 lb (59 kg). Her oral temperature is 98.6 °F (37 °C). Her blood pressure is 101/62 and her pulse is 97. Her respiration is 14 and oxygen saturation is 100%. Labs:   Admission on 05/15/2022   Component Date Value Ref Range Status    Glucose 05/15/2022 134* 70 - 99 mg/dL Final    BUN 05/15/2022 11  6 - 20 mg/dL Final    CREATININE 05/15/2022 0.70  0.50 - 0.90 mg/dL Final    Calcium 05/15/2022 8.8  8.6 - 10.4 mg/dL Final    Sodium 05/15/2022 139  135 - 144 mmol/L Final    Potassium 05/15/2022 3.5* 3.7 - 5.3 mmol/L Final    Chloride 05/15/2022 106  98 - 107 mmol/L Final    CO2 05/15/2022 20  20 - 31 mmol/L Final    Anion Gap 05/15/2022 13  9 - 17 mmol/L Final    GFR Non- 05/15/2022 >60  >60 mL/min Final    GFR  05/15/2022 >60  >60 mL/min Final    GFR Comment 05/15/2022        Final    Comment: Average GFR for 38-51 years old:   80 mL/min/1.73sq m  Chronic Kidney Disease:   <60 mL/min/1.73sq m  Kidney failure:   <15 mL/min/1.73sq m              eGFR calculated using average adult body mass.  Additional eGFR calculator available at:        [email or web address]            WBC 05/15/2022 15.5* 3.5 - 11.0 k/uL Final    RBC 05/15/2022 3.81* 4.0 - 5.2 m/uL Final    Hemoglobin 05/15/2022 12.0  12.0 - 16.0 g/dL Final    Hematocrit 05/15/2022 34.2* 36 - 46 % Final    MCV 05/15/2022 89.6  80 - 100 fL Final    MCH 05/15/2022 31.5  26 - 34 pg Final    MCHC 05/15/2022 35.2  31 - 37 g/dL Final    RDW 05/15/2022 11.9  11.5 - 14.9 % Final    Platelets 64/08/8434 253  150 - 450 k/uL Final    MPV 05/15/2022 8.4  6.0 - 12.0 fL Final    Seg Neutrophils 05/15/2022 72* 36 - 66 % Final    Lymphocytes 05/15/2022 17* 24 - 44 % Final    Monocytes 05/15/2022 9* 1 - 7 % Final    Eosinophils % 05/15/2022 1  0 - 4 % Final    Basophils 05/15/2022 1  0 - 2 % Final    Segs Absolute 05/15/2022 11.20* 1.3 - 9.1 k/uL Final    Absolute Lymph # 05/15/2022 2.60  1.0 - 4.8 k/uL Final    Absolute Mono # 05/15/2022 1.40* 0.1 - 1.3 k/uL Final    Absolute Eos # 05/15/2022 0.20  0.0 - 0.4 k/uL Final    Basophils Absolute 05/15/2022 0.10  0.0 - 0.2 k/uL Final    Ethanol 05/15/2022 <10  <10 mg/dL Final    Ethanol percent 05/15/2022 <0.010  % Final    Amphetamine Screen, Ur 05/15/2022 NEGATIVE  NEGATIVE Final    Comment:       (Positive cutoff 1000 ng/mL)                  Barbiturate Screen, Ur 05/15/2022 NEGATIVE  NEGATIVE Final    Comment:       (Positive cutoff 200 ng/mL)                  Benzodiazepine Screen, Urine 05/15/2022 NEGATIVE  NEGATIVE Final    Comment:       (Positive cutoff 200 ng/mL)                  Cocaine Metabolite, Urine 05/15/2022 POSITIVE* NEGATIVE Final    Comment:       (Positive cutoff 300 ng/mL)                  Methadone Screen, Urine 05/15/2022 NEGATIVE  NEGATIVE Final    Comment:       (Positive cutoff 300 ng/mL)                  Opiates, Urine 05/15/2022 NEGATIVE  NEGATIVE Final    Comment:       (Positive cutoff 300 ng/mL)                  Phencyclidine, Urine 05/15/2022 NEGATIVE  NEGATIVE Final    Comment:       (Positive cutoff 25 ng/mL)                  Cannabinoid Scrn, Ur 05/15/2022 NEGATIVE NEGATIVE Final    Comment:       (Positive cutoff 50 ng/mL)                  Oxycodone Screen, Ur 05/15/2022 NEGATIVE  NEGATIVE Final    Comment:       (Positive cutoff 100 ng/mL)                  Test Information 05/15/2022 Assay provides medical screening only. The absence of expected drug(s) and/or metabolite(s) may indicate diluted or adulterated urine, limitations of testing or timing of collection. Final    Comment: Testing for legal purposes should be confirmed by another method. To request confirmation   of test result, please call the lab within 7 days of sample submission.  hCG Qual 05/15/2022 NEGATIVE  NEGATIVE Final    Comment: Specimens with hCG levels near the threshold of the test (25 mIU/mL) may give a negative or   indeterminate result. In such cases, another test should be performed with a new specimen   in 48-72 hours. If early pregnancy is suspected clinically in this setting, correlation   with quantitative serum b-hCG level is suggested.  Magnesium 05/15/2022 1.9  1.6 - 2.6 mg/dL Final    Ventricular Rate 05/15/2022 77  BPM Final    Atrial Rate 05/15/2022 77  BPM Final    P-R Interval 05/15/2022 122  ms Final    QRS Duration 05/15/2022 86  ms Final    Q-T Interval 05/15/2022 436  ms Final    QTc Calculation (Bazett) 05/15/2022 493  ms Final    P Axis 05/15/2022 60  degrees Final    R Axis 05/15/2022 60  degrees Final    T Axis 05/15/2022 44  degrees Final         Reviewed patient's current plan of care and vital signs with nursing staff.     Labs reviewed: [x] Yes    Medications  Current Facility-Administered Medications: acetaminophen (TYLENOL) tablet 650 mg, 650 mg, Oral, Q6H PRN  ibuprofen (ADVIL;MOTRIN) tablet 400 mg, 400 mg, Oral, Q6H PRN  hydrOXYzine (ATARAX) tablet 50 mg, 50 mg, Oral, TID PRN  traZODone (DESYREL) tablet 50 mg, 50 mg, Oral, Nightly PRN  polyethylene glycol (GLYCOLAX) packet 17 g, 17 g, Oral, Daily PRN  aluminum & magnesium hydroxide-simethicone (MAALOX) 200-200-20 MG/5ML suspension 30 mL, 30 mL, Oral, Q6H PRN  nicotine polacrilex (NICORETTE) gum 2 mg, 2 mg, Oral, Q2H PRN  haloperidol (HALDOL) tablet 5 mg, 5 mg, Oral, Q6H PRN **AND** LORazepam (ATIVAN) tablet 2 mg, 2 mg, Oral, Q6H PRN  haloperidol lactate (HALDOL) injection 5 mg, 5 mg, IntraMUSCular, Q6H PRN **AND** LORazepam (ATIVAN) injection 2 mg, 2 mg, IntraMUSCular, Q6H PRN **AND** diphenhydrAMINE (BENADRYL) injection 50 mg, 50 mg, IntraMUSCular, Q6H PRN  albuterol sulfate  (90 Base) MCG/ACT inhaler 2 puff, 2 puff, Inhalation, Q6H PRN  busPIRone (BUSPAR) tablet 10 mg, 10 mg, Oral, TID  potassium chloride (KLOR-CON M) extended release tablet 40 mEq, 40 mEq, Oral, Once  risperiDONE (RISPERDAL) tablet 1 mg, 1 mg, Oral, BID    ASSESSMENT  Acute psychosis ([facility] [address]         HANDOFF  Patient symptoms are:  Unstable  Medications as determined by attending physician  Encourage participation in groups and milieu. Probable discharge is to be determined by MD    Electronically signed by GERARDO Busch CNP on 5/16/2022 at 5:47 PM    **This report has been created using voice recognition software. It may contain minor errors which are inherent in voice recognition technology. **    I independently saw and evaluated the patient. I reviewed the nurse practitioners documentation above. Any additional comments or changes to the nurse practitioners documentation are stated below otherwise agree with assessment. Plan will be as follows:  Patient was lightheaded and dizzy. Covering her eyes and very sensitive to light. Not really able to engage in sustained meaningful conversation. Will monitor vitals and blood pressure for now  PLAN  Patient s symptoms   show no change  Observation on current medication for now  Attempt to develop insight  Psycho-education conducted. Supportive Therapy conducted.   Probable discharge is undetermined at this time  Follow-up daily while on inpatient unit

## 2022-05-16 NOTE — PLAN OF CARE
Problem: Anxiety  Goal: Will report anxiety at manageable levels  Description: INTERVENTIONS:  1. Administer medication as ordered  2. Teach and rehearse alternative coping skills  3. Provide emotional support with 1:1 interaction with staff  5/16/2022 1724 by Karla Dickerson RN  Outcome: Progressing     Problem: Behavior  Goal: Pt/Family maintain appropriate behavior and adhere to behavioral management agreement, if implemented  Description: INTERVENTIONS:  1. Assess patient/family's coping skills and  non-compliant behavior (including use of illegal substances)  2. Notify security of behavior or suspected illegal substances which indicate the need for search of the patient and/or belongings  3. Encourage verbalization of thoughts and concerns in a socially appropriate manner  4. Utilize positive, consistent limit setting strategies supporting safety of patient, staff and others  5. Encourage participation in the decision making process about the behavioral management agreement  6. Implement a Health Care Agreement if patient meets criteria  7. If a patient's behavior jeopardizes the safety of the patient, staff, or others refer to organization policy. If a visitor's behavior poses a threat to safety call refer to organization policy. 8. Initiate consult with , Psychosocial CNS, Spiritual Care as appropriate  5/16/2022 1724 by Karla Dickerson RN  Outcome: Progressing     Problem: Nutrition Deficit:  Goal: Optimize nutritional status  5/16/2022 1724 by Karla Dickerson RN  Outcome: Progressing     Patient denies thoughts of self harm during this shift. Patient is irritable on approach however is cooperative with shift assessment and morning medications. Patient out in dayroom for needs in the afternoon and is focused on belongings and making phone calls.  Patient became irritable with staff and refused afternoon medications stating she \"does not take all these medications\" staff attempted to re- educate patient on medications and patient continued to refuse. Patient was able to have dentures brought in from home and able to eat dinner meal tray without issue. Q15 minute and random safety checks maintained.

## 2022-05-16 NOTE — PROGRESS NOTES
Comprehensive Nutrition Assessment    Type and Reason for Visit:  Initial,Positive Nutrition Screen (no teeth, Easy to Chew diet)    Nutrition Recommendations/Plan:   1. Will continue Regular diet with Ensure Enlive all trays     Malnutrition Assessment:  Malnutrition Status: At risk for malnutrition (Comment) (05/16/22 1130)    Context:  Acute Illness     Findings of the 6 clinical characteristics of malnutrition:  Energy Intake:  Mild decrease in energy intake (Comment)  Weight Loss:  Unable to assess     Body Fat Loss:  Unable to assess     Muscle Mass Loss:  Unable to assess    Fluid Accumulation:  No significant fluid accumulation     Strength:  Not Performed    Nutrition Assessment:    Pt was admitted due to mental health reasons. She has no teeth and frail build. Nutrition Related Findings:    no edema, Labs/Meds: Reviewed, PMH: HTN, dentures are at home Wound Type: None       Current Nutrition Intake & Therapies:    Average Meal Intake: 1-25%     ADULT ORAL NUTRITION SUPPLEMENT; Breakfast, Lunch, Dinner; Standard High Calorie/High Protein Oral Supplement  ADULT DIET; Regular    Anthropometric Measures:  Height: 5' 5\" (165.1 cm)  Ideal Body Weight (IBW): 125 lbs (57 kg)    Admission Body Weight: 130 lb (59 kg)  Current Body Weight: 130 lb (59 kg),   IBW. Weight Source: Stated  Current BMI (kg/m2): 21.6                          BMI Categories: Normal Weight (BMI 18.5-24. 9)    Estimated Daily Nutrient Needs:  Energy Requirements Based On: Formula  Weight Used for Energy Requirements: Admission  Energy (kcal/day): 1500 kcal ([place] x 1.2)  Weight Used for Protein Requirements: Current  Protein (g/day): 1.3g/kg= 75 g     Fluid (ml/day):      Nutrition Diagnosis:   · Inadequate oral intake related to biting/chewing (masticatory) difficulty as evidenced by intake 0-25%      Nutrition Interventions:   Food and/or Nutrient Delivery: Modify Current Diet,Start Oral Nutrition Supplement  Nutrition Education/Counseling: No recommendation at this time  Coordination of Nutrition Care: Continue to monitor while inpatient       Goals:     Goals: PO intake 50% or greater       Nutrition Monitoring and Evaluation:   Behavioral-Environmental Outcomes: None Identified  Food/Nutrient Intake Outcomes: Food and Nutrient Intake,Supplement Intake  Physical Signs/Symptoms Outcomes: Biochemical Data,Chewing or Swallowing,GI Status,Fluid Status or Edema,Skin,Weight    Discharge Planning:    Continue current diet     Vincent Mott, [address] [place] [address],   Contact: [phone]

## 2022-05-16 NOTE — H&P
[address] [facility]  Mary Kuo MD; Breanne Kelley MD; Samantha Almaraz MD; MD Sourav Guajardo MD; MD WAY[facility]   [facility]    HISTORY AND PHYSICAL EXAMINATION            Date:   5/16/2022  Patient name:  Carlton Brooke  Date of admission:  5/15/2022  1:08 AM  MRN:   [id number]  Account:  [de-identified]  YOB: 1975  PCP:    Kristi Skelton MD  Room:   [address] [place] [address]  Code Status:    Full Code    Chief Complaint:     Chief Complaint   Patient presents with    Suicidal   htn      History Obtained From:     Pt medical record    History of Present Illness:     Carlton Brooke is a 42 y.o. Non- / non  female who presents with Suicidal   and is admitted to the hospital for the management of Acute psychosis ([facility] [address] HTN  Onset more than 2 years ago  jessenia mild to mod  Controlled with current po meds  Not associated with headaches or blurry vision  No chest pain        Past Medical History:     Past Medical History:   Diagnosis Date    Allergic rhinitis 7/28/2015    DJD of shoulder     bilateral    Essential hypertension 5/16/2022    Fibromyalgia     Mixed hyperlipidemia 10/1/2018        Past Surgical History:     Past Surgical History:   Procedure Laterality Date    ANTERIOR CRUCIATE LIGAMENT REPAIR      Rt knee    TUBAL LIGATION          Medications Prior to Admission:     Prior to Admission medications    Medication Sig Start Date End Date Taking?  Authorizing Provider   mirtazapine (REMERON) 30 MG tablet Take 1 tablet by mouth nightly 5/11/22   Kristi Skelton MD   cyclobenzaprine (FLEXERIL) 10 MG tablet Take 1 tablet by mouth 3 times daily as needed for Muscle spasms 5/11/22 6/10/22  Kristi Skelton MD   busPIRone (BUSPAR) 10 MG tablet Take 1 tablet by mouth 3 times daily as needed (panic attacks) 5/11/22 6/10/22  Kristi Skelton MD   ibuprofen (ADVIL;MOTRIN) 800 MG tablet Take 1 tablet by mouth 3 times daily as needed for Pain 5/4/22   Melissa Romero, APRN - CNP   albuterol sulfate  (90 Base) MCG/ACT inhaler Inhale 2 puffs into the lungs every 6 hours as needed for Wheezing 4/18/22   Dameon Grimes, APRN - CNP   lisinopril (PRINIVIL;ZESTRIL) 20 MG tablet TAKE 1 TABLET BY MOUTH DAILY FOR HIGH BLOOD PRESSURE 10/27/21   Max Jerry MD   tiZANidine (ZANAFLEX) 4 MG tablet Take 1 tablet by mouth every 8 hours as needed (muscle spasms)  Patient not taking: Reported on 5/15/2022 10/27/21   Max Jerry MD   Menthol, Topical Analgesic, (ICY HOT EX) Apply topically Patient using the spray    Historical Provider, MD        Allergies:     Patient has no known allergies. Social History:     Tobacco:    reports that she has been smoking cigarettes. She has been smoking about 0.50 packs per day. She has never used smokeless tobacco.  Alcohol:      reports current alcohol use. Drug Use:  reports current drug use. Drug: Cocaine. Family History:     Family History   Problem Relation Age of Onset    Diabetes Mother     Hypertension Mother        Review of Systems:     Positive and Negative as described in HPI.     CONSTITUTIONAL:  negative for fevers, chills, sweats, fatigue, weight loss  HEENT:  negative for vision, hearing changes, runny nose, throat pain  RESPIRATORY:  negative for shortness of breath, cough, congestion, wheezing  CARDIOVASCULAR:  negative for chest pain, palpitations  GASTROINTESTINAL:  negative for nausea, vomiting, diarrhea, constipation, change in bowel habits, abdominal pain   GENITOURINARY:  negative for difficulty of urination, burning with urination, frequency   INTEGUMENT:  negative for rash, skin lesions, easy bruising   HEMATOLOGIC/LYMPHATIC:  negative for swelling/edema   ALLERGIC/IMMUNOLOGIC:  negative for urticaria , itching  ENDOCRINE:  negative increase in drinking, increase in urination, hot or cold intolerance  MUSCULOSKELETAL:  negative joint pains, muscle aches, swelling of joints  NEUROLOGICAL:  negative for headaches, dizziness, lightheadedness, numbness, pain, tingling extremities      Physical Exam:     Vitals:    05/15/22 0641 05/15/22 1632 05/15/22 2000 05/16/22 1030   BP: 113/63 101/60 105/70 101/62   Pulse: 73  72 97   Resp: 16  14    Temp: 98.6 °F (37 °C)  98.6 °F (37 °C)    TempSrc:   Oral    SpO2: 100%      Weight: 130 lb (59 kg)      Height: 5' 5\" (1.651 m)          General Appearance: alert, well appearing, and in no acute distress  Mental status: oriented to person, place, and time  Head: normocephalic, atraumatic  Eye: no icterus, redness, pupils equal and reactive, extraocular eye movements intact, conjunctiva clear  Ear: normal external ear, no discharge, hearing intact  Nose: no drainage noted  Mouth: mucous membranes moist  Neck: supple, no carotid bruits, thyroid not palpable  Lungs: Bilateral equal air entry, clear to ausculation, no wheezing, rales or rhonchi, normal effort  Cardiovascular: normal rate, regular rhythm, no murmur, gallop, rub  Abdomen: Soft, nontender, nondistended, normal bowel sounds, no hepatomegaly or splenomegaly  Neurologic: There are no new focal motor or sensory deficits, normal muscle tone and bulk, no abnormal sensation, normal speech, cranial nerves II through XII grossly intact  Skin: No gross lesions, rashes, bruising or bleeding on exposed skin area  Extremities: peripheral pulses palpable, no pedal edema or calf pain with palpation      Investigations:      Laboratory Testing:  No results found for this or any previous visit (from the past 24 hour(s)). Imaging/Diagnostics:  CT HEAD WO CONTRAST    Result Date: 5/15/2022  No acute intracranial abnormality.        URINE ANALYSIS: No results found for: LABURIN     CBC:  Lab Results   Component Value Date    WBC 15.5 05/15/2022    HGB 12.0 05/15/2022     05/15/2022        BMP:    Lab Results Component Value Date     05/15/2022    K 3.5 05/15/2022     05/15/2022    CO2 20 05/15/2022    BUN 11 05/15/2022    CREATININE 0.70 05/15/2022    GLUCOSE 134 05/15/2022      LIVER PROFILE:  Lab Results   Component Value Date    ALT 10 01/25/2021    AST 21 01/25/2021    PROT 7.5 01/25/2021    PROT 6.7 09/29/2018    BILITOT 0.80 01/25/2021    BILIDIR 0.1 09/29/2018    LABALBU 4.0 01/25/2021              Assessment :      Hospital Problems           Last Modified POA    * (Principal) Acute psychosis ([facility] [address] 5/15/2022 Yes    Essential hypertension 5/16/2022 Yes    Mixed hyperlipidemia 5/15/2022 Yes          Medications: Allergies:  No Known Allergies    Current Meds:   Scheduled Meds:    lisinopril  20 mg Oral Daily    busPIRone  10 mg Oral TID    potassium chloride  40 mEq Oral Once    risperiDONE  1 mg Oral BID     Continuous Infusions:   PRN Meds: acetaminophen, ibuprofen, hydrOXYzine, traZODone, polyethylene glycol, aluminum & magnesium hydroxide-simethicone, nicotine polacrilex, haloperidol **AND** LORazepam, haloperidol lactate **AND** LORazepam **AND** diphenhydrAMINE, albuterol sulfate HFA    Plan:     53-year-old lady with a history of hypertension start lisinopril 20 mg  Fibromyalgia on Flexeril 10 mg  Hypokalemia potassium 3.5 replace 40 M EQ orally 1 time  . ydnrb      5/16/22    · BP control ok   · On lisinopril  · Vitals ok 1. Hypokalemia resolved             Kali Nava MD  5/16/2022  2:04 PM    Copy sent to Dr. Magalis Ojeda MD    Please note that this chart was generated using voice recognition Dragon dictation software. Although every effort was made to ensure the accuracy of this automated transcription, some errors in transcription may have occurred.

## 2022-05-16 NOTE — PLAN OF CARE
Problem: Behavior  Goal: Pt/Family maintain appropriate behavior and adhere to behavioral management agreement, if implemented  Description: INTERVENTIONS:  1. Assess patient/family's coping skills and  non-compliant behavior (including use of illegal substances)  2. Notify security of behavior or suspected illegal substances which indicate the need for search of the patient and/or belongings  3. Encourage verbalization of thoughts and concerns in a socially appropriate manner  4. Utilize positive, consistent limit setting strategies supporting safety of patient, staff and others  5. Encourage participation in the decision making process about the behavioral management agreement  6. Implement a Health Care Agreement if patient meets criteria  7. If a patient's behavior jeopardizes the safety of the patient, staff, or others refer to organization policy. If a visitor's behavior poses a threat to safety call refer to organization policy. 8. Initiate consult with , Psychosocial CNS, Spiritual Care as appropriate  5/16/2022 4014 by Sandi Mazariegos LPN  Outcome: Not Progressing     Problem: Anxiety  Goal: Will report anxiety at manageable levels  Description: INTERVENTIONS:  1. Administer medication as ordered  2. Teach and rehearse alternative coping skills  3. Provide emotional support with 1:1 interaction with staff  5/16/2022 0337 by Sandi Mazariegos LPN  Outcome: Not Progressing  Flowsheets (Taken 5/16/2022 0334)  Will report anxiety at manageable levels:   Administer medication as ordered   Provide emotional support with 1:1 interaction with staff   Denies suicidal thoughts and remains free from harm at this time. Accepting of bed time meds, Poor po intake, poor  ADLs.

## 2022-05-16 NOTE — BH NOTE
Patient approached writer and requested to sign in. Writer walked patient to nurse station and gave patient the paper that the patient requested.

## 2022-05-16 NOTE — GROUP NOTE
Group Therapy Note    Date: 5/16/2022    Group Start Time: 1000  Group End Time: 0578  Group Topic: Psychotherapy    STCZ RODGER Marquez, LILIAW        Group Therapy Note    Attendees: 7/15       Patient was offered group therapy today but declined to participate despite encouragement from staff. 1:1 was offered.         Signature:  RODGER Morgan, KYRA

## 2022-05-16 NOTE — PROGRESS NOTES
Behavioral Services  Medicare Certification Upon Admission    I certify that this patient's inpatient psychiatric hospital admission is medically necessary for:    [x] (1) Treatment which could reasonably be expected to improve this patient's condition,       [x] (2) Or for diagnostic study;     AND     [x](2) The inpatient psychiatric services are provided while the individual is under the care of a physician and are included in the individualized plan of care.     Estimated length of stay/service 4 to 7 days    Plan for post-hospital care Home with outpatient community mental health follow-up    Electronically signed by Ricky Francisco MD on 5/15/2022 at 8:26 PM

## 2022-05-16 NOTE — GROUP NOTE
Group Therapy Note    Date: 5/16/2022    Group Start Time: 1100  Group End Time: 0345  Group Topic: Cognitive Skills    EDUAR Rodriguez Notice, CTRS        Group Therapy Note    Attendees: 8/15         Pt did not participate in Cognitive Skills Group at 1100am when encouraged by RT due to resting in room. Pt was offered talk time as an alternative to group but declined .        Discipline Responsible: Psychoeducational Specialist   Signature:  Rob Opitz

## 2022-05-16 NOTE — PLAN OF CARE
[address] [place] [address]  Day 3 Interdisciplinary Treatment Plan NOTE    Review Date & Time: 915am                5/16/2022                  Admission Type:   Admission Type: Involuntary    Reason for admission:  Reason for Admission: Brought in by police for bizarre behaviors.   Estimated Length of Stay: 5-7 days  Estimated Discharge Date Update: to be determined by physician    PATIENT STRENGTHS:  Patient Strengths    Patient Strengths and Limitations:Limitations: External locus of control,Tendency to isolate self,Unrealistic self-view,Difficulty problem solving/relies on others to help solve problems,Apathetic / unmotivated,Inappropriate/potentially harmful leisure interests  Addictive Behavior:Addictive Behavior  In the Past 3 Months, Have You Felt or Has Someone Told You That You Have a Problem With  : None  Medical Problems:  Past Medical History:   Diagnosis Date    Allergic rhinitis 7/28/2015    DJD of shoulder     bilateral    Essential hypertension 5/16/2022    Fibromyalgia     Mixed hyperlipidemia 10/1/2018       Risk:  Fall Risk   Best Scale Best Scale Score: 21  BVC    Change in scores no Changes to plan of Care no    Status EXAM:   Mental Status and Behavioral Exam  Normal: No  Level of Assistance: Independent/Self  Facial Expression: Avoids Gaze  Affect: Blunt  Level of Consciousness: Alert  Frequency of Checks: 4 times per hour, close  Mood:Normal: No  Mood: Ambivalent  Motor Activity:Normal: No  Motor Activity: Decreased  Eye Contact: Poor  Observed Behavior: Withdrawn,Cooperative  Sexual Misconduct History: Current - no  Preception: [place] to person,[place] to time,[place] to place,[place] to situation  Attention:Normal: No  Attention: Unable to concentrate  Thought Processes: Blocking  Thought Content:Normal: No  Thought Content: Poverty of content  Depression Symptoms: Isolative,Loss of interest,Change in energy level  Anxiety Symptoms: Generalized  Li Symptoms: No problems reported or observed. Hallucinations: None  Delusions: No  Memory:Normal: No  Memory: Poor remote,Poor recent  Insight and Judgment: No  Insight and Judgment: Poor judgment,Poor insight    Daily Assessment Last Entry:   Daily Sleep (WDL): Exceptions to WDL            Daily Nutrition (WDL): Exceptions to WDL  Appetite Change: No appetite  Barriers to Nutrition: None  Level of Assistance: Independent/Self    Patient Monitoring:  Frequency of Checks: 4 times per hour, close    Psychiatric Symptoms:   Depression Symptoms  Depression Symptoms: Isolative,Loss of interest,Change in energy level  Anxiety Symptoms  Anxiety Symptoms: Generalized  Li Symptoms  Li Symptoms: No problems reported or observed. Suicide Risk CSSR-S:  1) Within the past month, have you wished you were dead or wished you could go to sleep and not wake up? : Yes  2) Have you actually had any thoughts of killing yourself? : Yes  3) Have you been thinking about how you might kill yourself? : No  5) Have you started to work out or worked out the details of how to kill yourself?  Do you intend to carry out this plan? : No  6) Have you ever done anything, started to do anything, or prepared to do anything to end your life?: No  Change in Result                     no                     Change in Plan of care                no      EDUCATION:   EDUCATION:   Learner Progress Toward Treatment Goals: Reviewed results and recommendations of this team, Reviewed group plan and strategies, Reviewed signs, symptoms and risk of self harm and violent behavior, Reviewed goals and plan of care    Method:small group, individual verbal education    Outcome:verbalized by patient, but needs reinforcement to obtain goals    PATIENT GOALS:  Short term: Pt declined to attend team, pt did not develop a short term goal  Long term:Pt did not develop a long term goal    PLAN/TREATMENT RECOMMENDATIONS UPDATE: continue with group therapies, increased socialization, continue planning for after discharge goals, continue with medication compliance    SHORT-TERM GOALS UPDATE:   Time frame for Short-Term Goals: 5-7 days    LONG-TERM GOALS UPDATE:   Time frame for Long-Term Goals: 6 months  Members Present in Team Meeting: See Signature Sheet    Margie Monahan

## 2022-05-16 NOTE — GROUP NOTE
Group Therapy Note    Date: 2022    Group Start Time: 1430  Group End Time: 5539  Group Topic: Relaxation    EDUAR MARTIN D    BLAKE King        Group Therapy Note    Attendees:          Patient's Goal:  ***    Notes:  ***    Status After Intervention:  {Status After Intervention:008016990}    Participation Level: {Participation Level:471311486}    Participation Quality: {[facility] PARTICIPATION QUALITY:530015531}      Speech:  {[facility] CD_SPEECH:90113}      Thought Process/Content: {Thought Process/Content:245654287}      Affective Functioning: {Affective Functionin}      Mood: {Mood:454707939}      Level of consciousness:  {Level of consciousness:562218416}      Response to Learning: {[facility] Responses to Learnin}      Endings: {[facility] Endings:19517}    Modes of Intervention: {[facility] BHI Modes of Intervention:281377051}      Discipline Responsible: {[facility] Multidisciplinary:018008215}      Signature:  Sandro Cannon

## 2022-05-17 PROCEDURE — 99232 SBSQ HOSP IP/OBS MODERATE 35: CPT | Performed by: PSYCHIATRY & NEUROLOGY

## 2022-05-17 PROCEDURE — 99232 SBSQ HOSP IP/OBS MODERATE 35: CPT | Performed by: INTERNAL MEDICINE

## 2022-05-17 PROCEDURE — 6370000000 HC RX 637 (ALT 250 FOR IP): Performed by: PSYCHIATRY & NEUROLOGY

## 2022-05-17 PROCEDURE — 6370000000 HC RX 637 (ALT 250 FOR IP): Performed by: NURSE PRACTITIONER

## 2022-05-17 PROCEDURE — 1240000000 HC EMOTIONAL WELLNESS R&B

## 2022-05-17 RX ADMIN — BUSPIRONE HYDROCHLORIDE 10 MG: 10 TABLET ORAL at 21:56

## 2022-05-17 RX ADMIN — TRAZODONE HYDROCHLORIDE 50 MG: 50 TABLET ORAL at 21:56

## 2022-05-17 RX ADMIN — HYDROXYZINE HYDROCHLORIDE 50 MG: 50 TABLET, FILM COATED ORAL at 23:07

## 2022-05-17 NOTE — GROUP NOTE
Group Therapy Note    Date: 5/17/2022    Group Start Time: 0900  Group End Time: 0013  Group Topic: Community Meeting    [facility]        Group Therapy Note    Attendees: 6/16         Patient's Goal:  Do laundry, talk with dr about my need to be home    Notes:  controlled    Status After Intervention:  Unchanged    Participation Level: Active Listener and Interactive    Participation Quality: Appropriate and Attentive      Speech:  normal      Thought Process/Content: Logical      Affective Functioning: Congruent      Mood: stable      Level of consciousness:  Alert and Attentive      Response to Learning: Able to verbalize current knowledge/experience and Progressing to goal      Endings: None Reported    Modes of Intervention: Education, Support and Socialization      Discipline Responsible: Behavorial Health Tech      Signature:   Cal Rutherford

## 2022-05-17 NOTE — PLAN OF CARE
Problem: Anxiety  Goal: Will report anxiety at manageable levels  Description: INTERVENTIONS:  1. Administer medication as ordered  2. Teach and rehearse alternative coping skills  3. Provide emotional support with 1:1 interaction with staff  Outcome: Progressing   Patient is calm, controlled but refusing medications. Patient denies suicidal ideations but appears anxious. Patient is flat, quiet but did attend groups. Patient is aloof of peers, reports eating and sleeping adequately with safety checks Q15min and at irregular intervals. Problem: Behavior  Goal: Pt/Family maintain appropriate behavior and adhere to behavioral management agreement, if implemented  Description: INTERVENTIONS:  1. Assess patient/family's coping skills and  non-compliant behavior (including use of illegal substances)  2. Notify security of behavior or suspected illegal substances which indicate the need for search of the patient and/or belongings  3. Encourage verbalization of thoughts and concerns in a socially appropriate manner  4. Utilize positive, consistent limit setting strategies supporting safety of patient, staff and others  5. Encourage participation in the decision making process about the behavioral management agreement  6. Implement a Health Care Agreement if patient meets criteria  7. If a patient's behavior jeopardizes the safety of the patient, staff, or others refer to organization policy. If a visitor's behavior poses a threat to safety call refer to organization policy. 8. Initiate consult with , Psychosocial CNS, Spiritual Care as appropriate  Outcome: Progressing   Patient is calm, controlled but refusing medications. Patient denies suicidal ideations but appears anxious. Patient is flat, quiet but did attend groups. Patient is aloof of peers, reports eating and sleeping adequately with safety checks Q15min and at irregular intervals.

## 2022-05-17 NOTE — GROUP NOTE
Group Therapy Note    Date: 5/17/2022    Group Start Time: 1100  Group End Time: 2208  Group Topic: Music Therapy    EDUAR MARTIN G    Erick Pete        Group Therapy Note    Pt did not attend music therapy group d/t resting in room despite staff invitation to attend. 1:1 talk time offered as alternative to group session, pt declined.

## 2022-05-17 NOTE — PLAN OF CARE
Problem: Anxiety  Goal: Will report anxiety at manageable levels  Description: INTERVENTIONS:  1. Administer medication as ordered  2. Teach and rehearse alternative coping skills  3. Provide emotional support with 1:1 interaction with staff  5/16/2022 2236 by Suzanna Arguelles LPN  Outcome: Progressing  Note: Patient denies anxiety at this time. Patient educated and agrees to come to staff if anxiety occurs. Patient monitored every 15 minutes with environmental safety checks. Problem: Behavior  Goal: Pt/Family maintain appropriate behavior and adhere to behavioral management agreement, if implemented  Description: INTERVENTIONS:  1. Assess patient/family's coping skills and  non-compliant behavior (including use of illegal substances)  2. Notify security of behavior or suspected illegal substances which indicate the need for search of the patient and/or belongings  3. Encourage verbalization of thoughts and concerns in a socially appropriate manner  4. Utilize positive, consistent limit setting strategies supporting safety of patient, staff and others  5. Encourage participation in the decision making process about the behavioral management agreement  6. Implement a Health Care Agreement if patient meets criteria  7. If a patient's behavior jeopardizes the safety of the patient, staff, or others refer to organization policy. If a visitor's behavior poses a threat to safety call refer to organization policy. 8. Initiate consult with , Psychosocial CNS, Spiritual Care as appropriate  5/16/2022 2236 by Suzanna Arguelles LPN  Outcome: Progressing  Note: Patient denies suicidal/homicidal ideations and hallucinations this shift. Patient isolative to self and room this shift. Patient refused medications stating they are for anxiety and she is not anxious right now. Patient educated but need reenforcement. Patient monitored every 15 minutes with environmental safety checks.       Problem: Nutrition Deficit:  Goal:
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
## 2022-05-17 NOTE — GROUP NOTE
Group Therapy Note    Date: 5/17/2022    Group Start Time: 1330  Group End Time: 0349  Group Topic: Music Therapy    EDUAR Deng        Group Therapy Note    Attendees: 5/14       Patient's Goal:  Patients engaged in music appreciation group, sharing preferred music and engaging in conversations about music. Goals to increase self-expression, normalization of the environment; Increase socialziation    Notes:  Patient was pleasant and engaging throughout, engaging in conversations with peers and sharing music    Status After Intervention:  Improved    Participation Level:  Active Listener and Interactive    Participation Quality: Appropriate, Attentive and Sharing      Speech:  normal      Thought Process/Content: Logical  Linear      Affective Functioning: Congruent      Mood: euthymic      Level of consciousness:  Alert and Attentive      Response to Learning: Able to verbalize current knowledge/experience and Progressing to goal      Endings: None Reported    Modes of Intervention: Socialization, Exploration, Activity, Media and Reality-testing      Discipline Responsible: Psychoeducational Specialist      Signature:  Batsheva Deng

## 2022-05-17 NOTE — GROUP NOTE
HS Group   Date: May 16, 2022     Patient did not participate in HS group. 1:1 talk time was offered as an alternative. Will continue to encourage patient to participate in unit programming.      Signature: SALEEM Robert

## 2022-05-17 NOTE — H&P
[facility]  Hernandez Michelle MD; Lynette Alexander MD; Flora Licea MD; MD Sathish Dunbar MD; MD WAYNE Reyes [facility]   [facility]    HISTORY AND PHYSICAL EXAMINATION            Date:   5/17/2022  Patient name:  Becky Louise  Date of admission:  5/15/2022  1:08 AM  MRN:   [id number]  Account:  [de-identified]  YOB: 1975  PCP:    Ronald Nelson MD  Room:   [facility]0208-  Code Status:    Full Code    Chief Complaint:     Chief Complaint   Patient presents with    Suicidal   htn      History Obtained From:     Pt medical record    History of Present Illness:     Becky Louise is a 42 y.o. Non- / non  female who presents with Suicidal   and is admitted to the hospital for the management of Acute psychosis ([address] HTN  Onset more than 2 years ago  jessenia mild to mod  Controlled with current po meds  Not associated with headaches or blurry vision  No chest pain        Past Medical History:     Past Medical History:   Diagnosis Date    Allergic rhinitis 7/28/2015    DJD of shoulder     bilateral    Essential hypertension 5/16/2022    Fibromyalgia     Mixed hyperlipidemia 10/1/2018        Past Surgical History:     Past Surgical History:   Procedure Laterality Date    ANTERIOR CRUCIATE LIGAMENT REPAIR      Rt knee    TUBAL LIGATION          Medications Prior to Admission:     Prior to Admission medications    Medication Sig Start Date End Date Taking?  Authorizing Provider   mirtazapine (REMERON) 30 MG tablet Take 1 tablet by mouth nightly 5/11/22   Ronald Nelson MD   cyclobenzaprine (FLEXERIL) 10 MG tablet Take 1 tablet by mouth 3 times daily as needed for Muscle spasms 5/11/22 6/10/22  Ronald Nelson MD   busPIRone (BUSPAR) 10 MG tablet Take 1 tablet by mouth 3 times daily as needed (panic attacks) 5/11/22 6/10/22  Ronald Nelson MD   ibuprofen (ADVIL;MOTRIN) 800 MG tablet Take 1 tablet by mouth 3 times daily as needed for Pain 5/4/22   BarbarageGERARDO Lawrence CNP   albuterol sulfate  (90 Base) MCG/ACT inhaler Inhale 2 puffs into the lungs every 6 hours as needed for Wheezing 4/18/22   GERARDO Marie CNP   lisinopril (PRINIVIL;ZESTRIL) 20 MG tablet TAKE 1 TABLET BY MOUTH DAILY FOR HIGH BLOOD PRESSURE 10/27/21   Margie Olmstead MD   tiZANidine (ZANAFLEX) 4 MG tablet Take 1 tablet by mouth every 8 hours as needed (muscle spasms)  Patient not taking: Reported on 5/15/2022 10/27/21   Margie Olmstead MD   Menthol, Topical Analgesic, (ICY HOT EX) Apply topically Patient using the spray    Historical Provider, MD        Allergies:     Patient has no known allergies. Social History:     Tobacco:    reports that she has been smoking cigarettes. She has been smoking about 0.50 packs per day. She has never used smokeless tobacco.  Alcohol:      reports current alcohol use. Drug Use:  reports current drug use. Drug: Cocaine. Family History:     Family History   Problem Relation Age of Onset    Diabetes Mother     Hypertension Mother        Review of Systems:     Positive and Negative as described in HPI.     CONSTITUTIONAL:  negative for fevers, chills, sweats, fatigue, weight loss  HEENT:  negative for vision, hearing changes, runny nose, throat pain  RESPIRATORY:  negative for shortness of breath, cough, congestion, wheezing  CARDIOVASCULAR:  negative for chest pain, palpitations  GASTROINTESTINAL:  negative for nausea, vomiting, diarrhea, constipation, change in bowel habits, abdominal pain   GENITOURINARY:  negative for difficulty of urination, burning with urination, frequency   INTEGUMENT:  negative for rash, skin lesions, easy bruising   HEMATOLOGIC/LYMPHATIC:  negative for swelling/edema   ALLERGIC/IMMUNOLOGIC:  negative for urticaria , itching  ENDOCRINE:  negative increase in drinking, increase in urination, hot or cold intolerance  MUSCULOSKELETAL:  negative joint pains, muscle aches, swelling of joints  NEUROLOGICAL:  negative for headaches, dizziness, lightheadedness, numbness, pain, tingling extremities      Physical Exam:     Vitals:    05/15/22 2000 05/16/22 1030 05/16/22 1943 05/17/22 0730   BP: 105/70 101/62 104/75 90/64   Pulse: 72 97 95 89   Resp: 14  12    Temp: 98.6 °F (37 °C)  98.5 °F (36.9 °C) 98.3 °F (36.8 °C)   TempSrc: Oral  Oral Oral   SpO2:       Weight:       Height:           General Appearance: alert, well appearing, and in no acute distress  Mental status: oriented to person, place, and time  Head: normocephalic, atraumatic  Eye: no icterus, redness, pupils equal and reactive, extraocular eye movements intact, conjunctiva clear  Ear: normal external ear, no discharge, hearing intact  Nose: no drainage noted  Mouth: mucous membranes moist  Neck: supple, no carotid bruits, thyroid not palpable  Lungs: Bilateral equal air entry, clear to ausculation, no wheezing, rales or rhonchi, normal effort  Cardiovascular: normal rate, regular rhythm, no murmur, gallop, rub  Abdomen: Soft, nontender, nondistended, normal bowel sounds, no hepatomegaly or splenomegaly  Neurologic: There are no new focal motor or sensory deficits, normal muscle tone and bulk, no abnormal sensation, normal speech, cranial nerves II through XII grossly intact  Skin: No gross lesions, rashes, bruising or bleeding on exposed skin area  Extremities: peripheral pulses palpable, no pedal edema or calf pain with palpation      Investigations:      Laboratory Testing:  No results found for this or any previous visit (from the past 24 hour(s)). Imaging/Diagnostics:  CT HEAD WO CONTRAST    Result Date: 5/15/2022  No acute intracranial abnormality.        URINE ANALYSIS: No results found for: LABURIN     CBC:  Lab Results   Component Value Date    WBC 15.5 05/15/2022    HGB 12.0 05/15/2022     05/15/2022        BMP:    Lab Results   Component Value Date     05/15/2022    K 3.5 05/15/2022     05/15/2022    CO2 20 05/15/2022    BUN 11 05/15/2022    CREATININE 0.70 05/15/2022    GLUCOSE 134 05/15/2022      LIVER PROFILE:  Lab Results   Component Value Date    ALT 10 01/25/2021    AST 21 01/25/2021    PROT 7.5 01/25/2021    PROT 6.7 09/29/2018    BILITOT 0.80 01/25/2021    BILIDIR 0.1 09/29/2018    LABALBU 4.0 01/25/2021              Assessment :      Hospital Problems           Last Modified POA    * (Principal) Acute psychosis ([facility] [address] 5/15/2022 Yes    Essential hypertension 5/16/2022 Yes    Fibromyalgia 5/16/2022 Yes    Mixed hyperlipidemia 5/15/2022 Yes          Medications: Allergies:  No Known Allergies    Current Meds:   Scheduled Meds:    busPIRone  10 mg Oral TID    potassium chloride  40 mEq Oral Once    risperiDONE  1 mg Oral BID     Continuous Infusions:   PRN Meds: acetaminophen, ibuprofen, hydrOXYzine, traZODone, polyethylene glycol, aluminum & magnesium hydroxide-simethicone, nicotine polacrilex, haloperidol **AND** LORazepam, haloperidol lactate **AND** LORazepam **AND** diphenhydrAMINE, albuterol sulfate HFA    Plan:     68-year-old lady with a history of hypertension start lisinopril 20 mg  Fibromyalgia on Flexeril 10 mg  Hypokalemia potassium 3.5 replace 40 M EQ orally 1 time  . ydnrb      5/16/22    · BP control ok   · On lisinopril  · Vitals ok 1. Hypokalemia resolved           7/36/05    · Low systolic BP   · Not on antihypertensive meds  2. Lisinopril discontinued already   3. Will monitor            Paulette Lee MD  5/17/2022  1:55 PM    Copy sent to Dr. Tanesha Bridges MD    Please note that this chart was generated using voice recognition Dragon dictation software. Although every effort was made to ensure the accuracy of this automated transcription, some errors in transcription may have occurred.

## 2022-05-18 VITALS
RESPIRATION RATE: 14 BRPM | HEART RATE: 82 BPM | SYSTOLIC BLOOD PRESSURE: 100 MMHG | BODY MASS INDEX: 21.66 KG/M2 | WEIGHT: 130 LBS | DIASTOLIC BLOOD PRESSURE: 67 MMHG | OXYGEN SATURATION: 100 % | TEMPERATURE: 98.6 F | HEIGHT: 65 IN

## 2022-05-18 PROCEDURE — 99239 HOSP IP/OBS DSCHRG MGMT >30: CPT | Performed by: PSYCHIATRY & NEUROLOGY

## 2022-05-18 RX ORDER — BUSPIRONE HYDROCHLORIDE 10 MG/1
10 TABLET ORAL 3 TIMES DAILY
Qty: 90 TABLET | Refills: 0 | Status: SHIPPED | OUTPATIENT
Start: 2022-05-18

## 2022-05-18 NOTE — BH NOTE
Patient given tobacco quitline number [phone] at this time, refusing to call at this time, states \" I just dont want to quit now\"- patient given information as to the dangers of long term tobacco use. Continue to reinforce the importance of tobacco cessation.

## 2022-05-18 NOTE — BH NOTE
[facility]  Discharge Note    Pt discharged with followings belongings:   Dental Appliances: At home  Vision - Corrective Lenses: Eyeglasses  Hearing Aid: None  Jewelry: Earrings  Body Piercings Removed: No  Clothing: Jacket/Coat,Pants,Shirt,Socks,Undergarments,Belt  Other Valuables: Money,Keys,Wallet,Lighter/Matches   Valuables sent home withpatient. Patient education on aftercare instructions: given to patient  Information faxed to [facility] by nurse  at 11:56 AM .Patient verbalize understanding of AVS:  Yes, discharge home with spouse. Status EXAM upon discharge:  Mental Status and Behavioral Exam  Normal: Yes  Level of Assistance: Independent/Self  Facial Expression: Brightened  Affect: Appropriate  Level of Consciousness: Alert  Frequency of Checks: 4 times per hour, close  Mood:Normal: Yes  Mood: Depressed  Motor Activity:Normal: Yes  Motor Activity: Decreased  Eye Contact: Good  Observed Behavior: Cooperative  Sexual Misconduct History: Current - no  Preception: [place] to person,[place] to time,[place] to place,[place] to situation  Attention:Normal: Yes  Attention: Distractible  Thought Processes: Circumstantial  Thought Content:Normal: Yes  Thought Content: Preoccupations  Depression Symptoms: No problems reported or observed. Anxiety Symptoms: No problems reported or observed. Li Symptoms: No problems reported or observed.   Hallucinations: None  Delusions: No  Memory:Normal: Yes  Memory: Poor recent,Poor remote  Insight and Judgment: Yes  Insight and Judgment: Poor judgment,Poor insight      Metabolic Screening:    No results found for: LABA1C    Lab Results   Component Value Date    CHOL 185 09/29/2018     Lab Results   Component Value Date    TRIG 133 09/29/2018     Lab Results   Component Value Date    HDL 46 09/29/2018     No components found for: LDLCAL  No results found for: Son Lanier LPN

## 2022-05-18 NOTE — BH NOTE
Patient refused Buspar and Risperdal. She states she only take the Buspar when she has an anxiety attack and she doesn't need the Risperdal. Patient educated on taking her medications as prescribed.
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
## 2022-05-18 NOTE — PLAN OF CARE
Problem: Behavior  Goal: Pt/Family maintain appropriate behavior and adhere to behavioral management agreement, if implemented  Description: INTERVENTIONS:  1. Assess patient/family's coping skills and  non-compliant behavior (including use of illegal substances)  2. Notify security of behavior or suspected illegal substances which indicate the need for search of the patient and/or belongings  3. Encourage verbalization of thoughts and concerns in a socially appropriate manner  4. Utilize positive, consistent limit setting strategies supporting safety of patient, staff and others  5. Encourage participation in the decision making process about the behavioral management agreement  6. Implement a Health Care Agreement if patient meets criteria  7. If a patient's behavior jeopardizes the safety of the patient, staff, or others refer to organization policy. If a visitor's behavior poses a threat to safety call refer to organization policy. 8. Initiate consult with , Psychosocial CNS, Spiritual Care as appropriate  5/17/2022 2156 by Raj Scott  Outcome: Progressing     Problem: Anxiety  Goal: Will report anxiety at manageable levels  Description: INTERVENTIONS:  1. Administer medication as ordered  2. Teach and rehearse alternative coping skills  3. Provide emotional support with 1:1 interaction with staff  5/17/2022 2146 by Raj Scott  Outcome: Progressing     Problem: Anxiety  Goal: Will report anxiety at manageable levels  Description: INTERVENTIONS:  1. Administer medication as ordered  2. Teach and rehearse alternative coping skills  3. Provide emotional support with 1:1 interaction with staff  5/17/2022 2146 by Raj Scott  Outcome: Progressing     Problem: Anxiety  Goal: Will report anxiety at manageable levels  Description: INTERVENTIONS:  1. Administer medication as ordered  2. Teach and rehearse alternative coping skills  3.  Provide emotional support with 1:1 interaction with staff  5/17/2022 2156 by Judit Smoke  Outcome: Progressing     Problem: Behavior  Goal: Pt/Family maintain appropriate behavior and adhere to behavioral management agreement, if implemented  Description: INTERVENTIONS:  1. Assess patient/family's coping skills and  non-compliant behavior (including use of illegal substances)  2. Notify security of behavior or suspected illegal substances which indicate the need for search of the patient and/or belongings  3. Encourage verbalization of thoughts and concerns in a socially appropriate manner  4. Utilize positive, consistent limit setting strategies supporting safety of patient, staff and others  5. Encourage participation in the decision making process about the behavioral management agreement  6. Implement a Health Care Agreement if patient meets criteria  7. If a patient's behavior jeopardizes the safety of the patient, staff, or others refer to organization policy. If a visitor's behavior poses a threat to safety call refer to organization policy. 8. Initiate consult with , Psychosocial CNS, Spiritual Care as appropriate  5/17/2022 2156 by Judit Smoke  Outcome: Progressing     Problem: Behavior  Goal: Pt/Family maintain appropriate behavior and adhere to behavioral management agreement, if implemented  Description: INTERVENTIONS:  1. Assess patient/family's coping skills and  non-compliant behavior (including use of illegal substances)  2. Notify security of behavior or suspected illegal substances which indicate the need for search of the patient and/or belongings  3. Encourage verbalization of thoughts and concerns in a socially appropriate manner  4. Utilize positive, consistent limit setting strategies supporting safety of patient, staff and others  5. Encourage participation in the decision making process about the behavioral management agreement  6. Implement a Health Care Agreement if patient meets criteria  7.  If a patient's behavior jeopardizes the safety of the patient, staff, or others refer to organization policy. If a visitor's behavior poses a threat to safety call refer to organization policy.   8. Initiate consult with , Psychosocial CNS, Spiritual Care as appropriate  5/17/2022 2146 by Pati Caballero  Outcome: Progressing

## 2022-05-18 NOTE — PLAN OF CARE
Problem: Anxiety  Goal: Will report anxiety at manageable levels  Description: INTERVENTIONS:  1. Administer medication as ordered  2. Teach and rehearse alternative coping skills  3. Provide emotional support with 1:1 interaction with staff  5/18/2022 0958 by Abelino Clinton LPN  Outcome: Adequate for Discharge  Flowsheets (Taken 5/18/2022 4124)  Will report anxiety at manageable levels:   Teach and rehearse alternative coping skills   Provide emotional support with 1:1 interaction with staff  Note: Patient denies having any anxiety at this time. She states she will notify staff if she starts having anxiety. Problem: Behavior  Goal: Pt/Family maintain appropriate behavior and adhere to behavioral management agreement, if implemented  Description: INTERVENTIONS:  1. Assess patient/family's coping skills and  non-compliant behavior (including use of illegal substances)  2. Notify security of behavior or suspected illegal substances which indicate the need for search of the patient and/or belongings  3. Encourage verbalization of thoughts and concerns in a socially appropriate manner  4. Utilize positive, consistent limit setting strategies supporting safety of patient, staff and others  5. Encourage participation in the decision making process about the behavioral management agreement  6. Implement a Health Care Agreement if patient meets criteria  7. If a patient's behavior jeopardizes the safety of the patient, staff, or others refer to organization policy. If a visitor's behavior poses a threat to safety call refer to organization policy.   8. Initiate consult with , Psychosocial CNS, Spiritual Care as appropriate  5/18/2022 0958 by Abelino Clinton LPN  Outcome: Adequate for Discharge  Flowsheets (Taken 5/18/2022 8844)  Patient/family maintains appropriate behavior and adheres to behavioral management agreement, if implemented:   Encourage verbalization of thoughts and concerns in a socially appropriate manner   Utilize positive, consistent limit setting strategies supporting safety of patient, staff and others   Encourage participation in the decision making process about the behavioral management agreement  Note: Patient has remain in behavior control and no outburst. She is cooperative with staff and peers on the unit.

## 2022-05-18 NOTE — PLAN OF CARE
Problem: Anxiety  Goal: Will report anxiety at manageable levels  Description: INTERVENTIONS:  1. Administer medication as ordered  2. Teach and rehearse alternative coping skills  3. Provide emotional support with 1:1 interaction with staff  5/17/2022 2146 by Vargas Agosto  Outcome: Progressing   Relates anxiety is less than when admitted. Problem: Behavior  Goal: Pt/Family maintain appropriate behavior and adhere to behavioral management agreement, if implemented  Description: INTERVENTIONS:  1. Assess patient/family's coping skills and  non-compliant behavior (including use of illegal substances)  2. Notify security of behavior or suspected illegal substances which indicate the need for search of the patient and/or belongings  3. Encourage verbalization of thoughts and concerns in a socially appropriate manner  4. Utilize positive, consistent limit setting strategies supporting safety of patient, staff and others  5. Encourage participation in the decision making process about the behavioral management agreement  6. Implement a Health Care Agreement if patient meets criteria  7. If a patient's behavior jeopardizes the safety of the patient, staff, or others refer to organization policy. If a visitor's behavior poses a threat to safety call refer to organization policy. 8. Initiate consult with , Psychosocial CNS, Spiritual Care as appropriate  5/17/2022 2146 by Vargas Agosto  Outcome: Progressing   Coop. With vitals taken. Pleasant on approach. Coop. Et controlled. Attends all unit groups et unit activities. States will be D/C tomorrow hopefully. Dosent want to get her hopes up. States since being here has been able to be quiet. States likes finding time to talk to God. Needing a plan of action when D/C. Lost my Job. Get so tired of people telling me I,m a strong person. People moncho to know and accept I,m not always that strong. . nourishment taken well. Accepting of all medications provided.

## 2022-05-26 ENCOUNTER — TELEPHONE (OUTPATIENT)
Dept: BEHAVIORAL/MENTAL HEALTH CLINIC | Age: 47
End: 2022-05-26

## 2022-05-26 NOTE — TELEPHONE ENCOUNTER
[facility] outreach to check-in with patient and ensure she was established or had an appointment with [employer] or alternative agency of choice. Patient may call the office back or schedule an appointment with [facility] at [phone].

## 2022-06-03 ENCOUNTER — NURSE TRIAGE (OUTPATIENT)
Dept: OTHER | Facility: CLINIC | Age: 47
End: 2022-06-03
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
## 2022-06-08 ENCOUNTER — HOSPITAL ENCOUNTER (EMERGENCY)
Age: 47
Discharge: HOME | End: 2022-06-08
Attending: STUDENT IN AN ORGANIZED HEALTH CARE EDUCATION/TRAINING PROGRAM
Payer: COMMERCIAL

## 2022-06-08 ENCOUNTER — APPOINTMENT (OUTPATIENT)
Dept: GENERAL RADIOLOGY | Age: 47
End: 2022-06-08
Payer: COMMERCIAL

## 2022-06-08 VITALS
DIASTOLIC BLOOD PRESSURE: 84 MMHG | WEIGHT: 126.98 LBS | HEART RATE: 91 BPM | OXYGEN SATURATION: 99 % | SYSTOLIC BLOOD PRESSURE: 107 MMHG | HEIGHT: 65 IN | TEMPERATURE: 97 F | BODY MASS INDEX: 21.16 KG/M2 | RESPIRATION RATE: 18 BRPM

## 2022-06-08 DIAGNOSIS — N30.00 ACUTE CYSTITIS WITHOUT HEMATURIA: Primary | ICD-10-CM

## 2022-06-08 LAB
ABSOLUTE EOS #: 0.3 K/UL (ref 0–0.4)
ABSOLUTE LYMPH #: 2.4 K/UL (ref 1–4.8)
ABSOLUTE MONO #: 0.7 K/UL (ref 0.1–1.3)
ALBUMIN SERPL-MCNC: 4 G/DL (ref 3.5–5.2)
ALP BLD-CCNC: 85 U/L (ref 35–104)
ALT SERPL-CCNC: 8 U/L (ref 5–33)
ANION GAP SERPL CALCULATED.3IONS-SCNC: 12 MMOL/L (ref 9–17)
AST SERPL-CCNC: 19 U/L
BACTERIA: ABNORMAL
BASOPHILS # BLD: 1 % (ref 0–2)
BASOPHILS ABSOLUTE: 0.1 K/UL (ref 0–0.2)
BILIRUB SERPL-MCNC: 0.35 MG/DL (ref 0.3–1.2)
BILIRUBIN URINE: NEGATIVE
BUN BLDV-MCNC: 14 MG/DL (ref 6–20)
CALCIUM SERPL-MCNC: 9.1 MG/DL (ref 8.6–10.4)
CARBOXYHEMOGLOBIN: 4.1 % (ref 0–5)
CASTS UA: ABNORMAL /LPF
CHLORIDE BLD-SCNC: 106 MMOL/L (ref 98–107)
CO2: 19 MMOL/L (ref 20–31)
COLOR: YELLOW
CREAT SERPL-MCNC: 0.73 MG/DL (ref 0.5–0.9)
D-DIMER QUANTITATIVE: 0.33 MG/L FEU (ref 0–0.59)
EOSINOPHILS RELATIVE PERCENT: 3 % (ref 0–4)
EPITHELIAL CELLS UA: ABNORMAL /HPF
GFR AFRICAN AMERICAN: >60 ML/MIN
GFR NON-AFRICAN AMERICAN: >60 ML/MIN
GFR SERPL CREATININE-BSD FRML MDRD: ABNORMAL ML/MIN/{1.73_M2}
GLUCOSE BLD-MCNC: 137 MG/DL (ref 70–99)
GLUCOSE URINE: ABNORMAL
HCG QUALITATIVE: NEGATIVE
HCT VFR BLD CALC: 37.7 % (ref 36–46)
HEMOGLOBIN: 13 G/DL (ref 12–16)
KETONES, URINE: ABNORMAL
LEUKOCYTE ESTERASE, URINE: ABNORMAL
LIPASE: 112 U/L (ref 13–60)
LYMPHOCYTES # BLD: 25 % (ref 24–44)
MAGNESIUM: 2.1 MG/DL (ref 1.6–2.6)
MCH RBC QN AUTO: 31 PG (ref 26–34)
MCHC RBC AUTO-ENTMCNC: 34.5 G/DL (ref 31–37)
MCV RBC AUTO: 90 FL (ref 80–100)
MONOCYTES # BLD: 8 % (ref 1–7)
NITRITE, URINE: NEGATIVE
PDW BLD-RTO: 12.8 % (ref 11.5–14.9)
PH UA: 5 (ref 5–8)
PLATELET # BLD: 261 K/UL (ref 150–450)
PMV BLD AUTO: 8 FL (ref 6–12)
POTASSIUM SERPL-SCNC: 4.1 MMOL/L (ref 3.7–5.3)
PROTEIN UA: ABNORMAL
RBC # BLD: 4.19 M/UL (ref 4–5.2)
RBC UA: ABNORMAL /HPF
SEG NEUTROPHILS: 63 % (ref 36–66)
SEGMENTED NEUTROPHILS ABSOLUTE COUNT: 6.4 K/UL (ref 1.3–9.1)
SODIUM BLD-SCNC: 137 MMOL/L (ref 135–144)
SPECIFIC GRAVITY UA: 1.03 (ref 1–1.03)
TOTAL PROTEIN: 6.5 G/DL (ref 6.4–8.3)
TROPONIN, HIGH SENSITIVITY: <6 NG/L (ref 0–14)
TSH SERPL DL<=0.05 MIU/L-ACNC: 0.4 UIU/ML (ref 0.3–5)
TURBIDITY: CLEAR
URINE HGB: ABNORMAL
UROBILINOGEN, URINE: NORMAL
WBC # BLD: 9.9 K/UL (ref 3.5–11)
WBC UA: ABNORMAL /HPF

## 2022-06-08 PROCEDURE — 71046 X-RAY EXAM CHEST 2 VIEWS: CPT

## 2022-06-08 PROCEDURE — 85025 COMPLETE CBC W/AUTO DIFF WBC: CPT

## 2022-06-08 PROCEDURE — 84703 CHORIONIC GONADOTROPIN ASSAY: CPT

## 2022-06-08 PROCEDURE — 84443 ASSAY THYROID STIM HORMONE: CPT

## 2022-06-08 PROCEDURE — 85379 FIBRIN DEGRADATION QUANT: CPT

## 2022-06-08 PROCEDURE — 84484 ASSAY OF TROPONIN QUANT: CPT

## 2022-06-08 PROCEDURE — 82375 ASSAY CARBOXYHB QUANT: CPT

## 2022-06-08 PROCEDURE — 83690 ASSAY OF LIPASE: CPT

## 2022-06-08 PROCEDURE — 96374 THER/PROPH/DIAG INJ IV PUSH: CPT

## 2022-06-08 PROCEDURE — 87086 URINE CULTURE/COLONY COUNT: CPT

## 2022-06-08 PROCEDURE — 83735 ASSAY OF MAGNESIUM: CPT

## 2022-06-08 PROCEDURE — 36415 COLL VENOUS BLD VENIPUNCTURE: CPT

## 2022-06-08 PROCEDURE — 6360000002 HC RX W HCPCS: Performed by: STUDENT IN AN ORGANIZED HEALTH CARE EDUCATION/TRAINING PROGRAM

## 2022-06-08 PROCEDURE — 99285 EMERGENCY DEPT VISIT HI MDM: CPT

## 2022-06-08 PROCEDURE — 93005 ELECTROCARDIOGRAM TRACING: CPT | Performed by: STUDENT IN AN ORGANIZED HEALTH CARE EDUCATION/TRAINING PROGRAM

## 2022-06-08 PROCEDURE — 81001 URINALYSIS AUTO W/SCOPE: CPT

## 2022-06-08 PROCEDURE — 80053 COMPREHEN METABOLIC PANEL: CPT

## 2022-06-08 RX ORDER — CEPHALEXIN 500 MG/1
500 CAPSULE ORAL 2 TIMES DAILY
Qty: 10 CAPSULE | Refills: 0 | Status: SHIPPED | OUTPATIENT
Start: 2022-06-08 | End: 2022-06-13

## 2022-06-08 RX ORDER — KETOROLAC TROMETHAMINE 30 MG/ML
15 INJECTION, SOLUTION INTRAMUSCULAR; INTRAVENOUS ONCE
Status: COMPLETED | OUTPATIENT
Start: 2022-06-08 | End: 2022-06-08

## 2022-06-08 RX ADMIN — KETOROLAC TROMETHAMINE 15 MG: 30 INJECTION, SOLUTION INTRAMUSCULAR at 19:42

## 2022-06-08 ASSESSMENT — ENCOUNTER SYMPTOMS
DIARRHEA: 0
NAUSEA: 0
BLOOD IN STOOL: 0
VOMITING: 0
CONSTIPATION: 0
SHORTNESS OF BREATH: 1
ABDOMINAL PAIN: 1

## 2022-06-08 ASSESSMENT — PAIN - FUNCTIONAL ASSESSMENT
PAIN_FUNCTIONAL_ASSESSMENT: ACTIVITIES ARE NOT PREVENTED
PAIN_FUNCTIONAL_ASSESSMENT: 0-10

## 2022-06-08 ASSESSMENT — PAIN SCALES - GENERAL
PAINLEVEL_OUTOF10: 8
PAINLEVEL_OUTOF10: 7
PAINLEVEL_OUTOF10: 8
PAINLEVEL_OUTOF10: 7

## 2022-06-08 ASSESSMENT — LIFESTYLE VARIABLES
HOW OFTEN DO YOU HAVE A DRINK CONTAINING ALCOHOL: 4 OR MORE TIMES A WEEK
HOW MANY STANDARD DRINKS CONTAINING ALCOHOL DO YOU HAVE ON A TYPICAL DAY: 1 OR 2

## 2022-06-08 ASSESSMENT — PAIN DESCRIPTION - LOCATION: LOCATION: GENERALIZED

## 2022-06-08 ASSESSMENT — PAIN DESCRIPTION - PAIN TYPE: TYPE: ACUTE PAIN

## 2022-06-08 ASSESSMENT — PAIN DESCRIPTION - FREQUENCY: FREQUENCY: CONTINUOUS

## 2022-06-08 ASSESSMENT — PAIN DESCRIPTION - DESCRIPTORS: DESCRIPTORS: DISCOMFORT

## 2022-06-08 NOTE — ED PROVIDER NOTES
[address]  Emergency Department Encounter  Emergency Medicine Resident     Pt Name: Cori Tabares  STF:[id number]  [place] 1975  Date of evaluation: 6/8/22  PCP:  Aarti Sandy MD    CHIEF COMPLAINT       Chief Complaint   Patient presents with    Weight Loss     HISTORY OF PRESENT ILLNESS  (Location/Symptom, Timing/Onset, Context/Setting, Quality, Duration, ModifyingFactors, Severity.)      Cori Tabares is a 42 y.o. female with PMH of hypertension, hyperlipidemia, who presents for evaluation of weight loss, right-sided flank pain. Patient reports it is something \"off\" in her house. Believes there are electromagnetic fields that are making her sick. States that every time she gets near an electronic she has sharp shooting pain on the right side of her body. Also reports that she has lost 15 pounds over the past few months. Complaining of chest pressure, shortness of breath, right-sided back/chest wall/abdominal pain. No fever, cough, rash, numbness, weakness, vomiting, diarrhea, patient, dysuria, frequency, vaginal bleeding/discharge. Patient does state that \"it feels off\" when she urinates but is unable to elaborate further. No SI/HI or hallucinations. Has not taken any medications at home for symptoms. PAST MEDICAL / SURGICAL / SOCIAL /FAMILY HISTORY      has a past medical history of Allergic rhinitis, DJD of shoulder, Essential hypertension, Fibromyalgia, and Mixed hyperlipidemia. No other pertinent PMH on review with patient/guardian. has a past surgical history that includes Anterior cruciate ligament repair and Tubal ligation. No other pertinent PSH on review with patient/guardian.   Social History     Socioeconomic History    Marital status:      Spouse name: Not on file    Number of children: Not on file    Years of education: Not on file    Highest education level: Not on file   Occupational History    Not on file   Tobacco Use    Smoking status: Current Some Day Smoker     Packs/day: 0.50     Types: Cigarettes    Smokeless tobacco: Never Used   Vaping Use    Vaping Use: Never used   Substance and Sexual Activity    Alcohol use: Yes     Comment: drinks beer daily    Drug use: Yes     Types: Cocaine     Comment: pt states last used 2 days ago    Sexual activity: Yes   Other Topics Concern    Not on file   Social History Narrative    Not on file     Social Determinants of Health     Financial Resource Strain: Low Risk     Difficulty of Paying Living Expenses: Not hard at all   Food Insecurity: No Food Insecurity    Worried About Running Out of Food in the Last Year: Never true    Kari of Food in the Last Year: Never true   Transportation Needs:     Lack of Transportation (Medical): Not on file    Lack of Transportation (Non-Medical): Not on file   Physical Activity:     Days of Exercise per Week: Not on file    Minutes of Exercise per Session: Not on file   Stress:     Feeling of Stress : Not on file   Social Connections:     Frequency of Communication with Friends and Family: Not on file    Frequency of Social Gatherings with Friends and Family: Not on file    Attends Scientologist Services: Not on file    Active Member of [address] [place] [address] [employer] or Organizations: Not on file    Attends Club or Organization Meetings: Not on file    Marital Status: Not on file   Intimate Partner Violence:     Fear of Current or Ex-Partner: Not on file    Emotionally Abused: Not on file    Physically Abused: Not on file    Sexually Abused: Not on file   Housing Stability:     Unable to Pay for Housing in the Last Year: Not on file    Number of [place] in the Last Year: Not on file    Unstable Housing in the Last Year: Not on file       I counseled the patient against using tobacco products.     Family History   Problem Relation Age of Onset    Diabetes Mother     Hypertension Mother      No other pertinent FamHx on review with patient/guardian. Allergies:  Patient has no known allergies. Home Medications:  Prior to Admission medications    Medication Sig Start Date End Date Taking? Authorizing Provider   cephALEXin (KEFLEX) 500 MG capsule Take 1 capsule by mouth 2 times daily for 5 days 6/8/22 6/13/22 Yes Bernie Poe DO   busPIRone (BUSPAR) 10 MG tablet Take 1 tablet by mouth 3 times daily 5/18/22   Destin Ford MD   ibuprofen (ADVIL;MOTRIN) 800 MG tablet Take 1 tablet by mouth 3 times daily as needed for Pain 5/4/22   Kunal Valle APRN - CNP   albuterol sulfate  (90 Base) MCG/ACT inhaler Inhale 2 puffs into the lungs every 6 hours as needed for Wheezing 4/18/22   GERARDO Herrera - CNP       REVIEW OF SYSTEMS    (2-9 systems for level 4, 10 ormore for level 5)      Review of Systems   Constitutional: Negative for fever. Eyes: Negative for visual disturbance. Respiratory: Positive for shortness of breath. Cardiovascular: Positive for chest pain. Negative for leg swelling. Gastrointestinal: Positive for abdominal pain. Negative for blood in stool, constipation, diarrhea, nausea and vomiting. Genitourinary: Negative for dysuria, frequency, urgency, vaginal bleeding and vaginal discharge. Skin: Negative for rash. Allergic/Immunologic: Negative for immunocompromised state. Neurological: Negative for headaches. Hematological: Does not bruise/bleed easily. PHYSICAL EXAM   (up to 7 for level 4, 8 or more for level 5)      INITIAL VITALS:   /84   Pulse 91   Temp 97 °F (36.1 °C) (Temporal)   Resp 18   Ht 5' 5\" (1.651 m)   Wt 126 lb 15.8 oz (57.6 kg)   LMP 05/08/2022   SpO2 99%   BMI 21.13 kg/m²     Physical Exam  Constitutional:       General: She is not in acute distress. Appearance: Normal appearance. She is not ill-appearing, toxic-appearing or diaphoretic. HENT:      Head: Normocephalic and atraumatic.       Right Ear: External ear normal.      Left Ear: External ear normal.   Eyes:      General:         Right eye: No discharge. Left eye: No discharge. Cardiovascular:      Rate and Rhythm: Normal rate and regular rhythm. Pulses: Normal pulses. Heart sounds: No murmur heard. Pulmonary:      Effort: Pulmonary effort is normal. No respiratory distress. Breath sounds: Normal breath sounds. No wheezing, rhonchi or rales. Abdominal:      Palpations: Abdomen is soft. Tenderness: There is abdominal tenderness (Epigastric tenderness). There is no right CVA tenderness, left CVA tenderness, guarding or rebound. Musculoskeletal:      Right lower leg: No edema. Left lower leg: No edema. Comments: Tenderness with light touch to right side of back, over right side of ribs, and right anterior chest wall. Skin:     Capillary Refill: Capillary refill takes less than 2 seconds. Neurological:      General: No focal deficit present. Mental Status: She is alert. Comments: 5/5 strength BUE/BLE. Sensation intact.        DIFFERENTIAL  DIAGNOSIS     PLAN (LABS / IMAGING / EKG):  Orders Placed This Encounter   Procedures    Culture, Urine    XR CHEST (2 VW)    CBC with Auto Differential    D-Dimer, Quantitative    Lipase    Comprehensive Metabolic Panel    Magnesium    TSH w/reflex to FT4    HCG Qualitative, Serum    Urinalysis with Reflex to Culture    CARBOXYHEMOGLOBIN    Troponin    Microscopic Urinalysis    Weigh patient    EKG 12 Lead     MEDICATIONS ORDERED:  Orders Placed This Encounter   Medications    ketorolac (TORADOL) injection 15 mg    cephALEXin (KEFLEX) 500 MG capsule     Sig: Take 1 capsule by mouth 2 times daily for 5 days     Dispense:  10 capsule     Refill:  0     DIAGNOSTIC RESULTS / EMERGENCY DEPARTMENT COURSE / MDM     LABS:  Results for orders placed or performed during the hospital encounter of 06/08/22   CBC with Auto Differential   Result Value Ref Range    WBC 9.9 3.5 - 11.0 k/uL    RBC 4.19 4.0 - 5.2 m/uL    Hemoglobin 13.0 12.0 - 16.0 g/dL    Hematocrit 37.7 36 - 46 %    MCV 90.0 80 - 100 fL    MCH 31.0 26 - 34 pg    MCHC 34.5 31 - 37 g/dL    RDW 12.8 11.5 - 14.9 %    Platelets 465 424 - 480 k/uL    MPV 8.0 6.0 - 12.0 fL    Seg Neutrophils 63 36 - 66 %    Lymphocytes 25 24 - 44 %    Monocytes 8 (H) 1 - 7 %    Eosinophils % 3 0 - 4 %    Basophils 1 0 - 2 %    Segs Absolute 6.40 1.3 - 9.1 k/uL    Absolute Lymph # 2.40 1.0 - 4.8 k/uL    Absolute Mono # 0.70 0.1 - 1.3 k/uL    Absolute Eos # 0.30 0.0 - 0.4 k/uL    Basophils Absolute 0.10 0.0 - 0.2 k/uL   D-Dimer, Quantitative   Result Value Ref Range    D-Dimer, Quant 0.33 0.00 - 0.59 mg/L FEU   Lipase   Result Value Ref Range    Lipase 112 (H) 13 - 60 U/L   Comprehensive Metabolic Panel   Result Value Ref Range    Glucose 137 (H) 70 - 99 mg/dL    BUN 14 6 - 20 mg/dL    CREATININE 0.73 0.50 - 0.90 mg/dL    Calcium 9.1 8.6 - 10.4 mg/dL    Sodium 137 135 - 144 mmol/L    Potassium 4.1 3.7 - 5.3 mmol/L    Chloride 106 98 - 107 mmol/L    CO2 19 (L) 20 - 31 mmol/L    Anion Gap 12 9 - 17 mmol/L    Alkaline Phosphatase 85 35 - 104 U/L    ALT 8 5 - 33 U/L    AST 19 <32 U/L    Total Bilirubin 0.35 0.3 - 1.2 mg/dL    Total Protein 6.5 6.4 - 8.3 g/dL    Albumin 4.0 3.5 - 5.2 g/dL    GFR Non-African American >60 >60 mL/min    GFR African American >60 >60 mL/min    GFR Comment         Magnesium   Result Value Ref Range    Magnesium 2.1 1.6 - 2.6 mg/dL   TSH w/reflex to FT4   Result Value Ref Range    TSH 0.40 0.30 - 5.00 uIU/mL   HCG Qualitative, Serum   Result Value Ref Range    hCG Qual NEGATIVE NEGATIVE   Urinalysis with Reflex to Culture    Specimen: Urine   Result Value Ref Range    Color, UA Yellow Yellow    Turbidity UA Clear Clear    Glucose, Ur MOD (A) NEGATIVE    Bilirubin Urine NEGATIVE NEGATIVE    Ketones, Urine TRACE (A) NEGATIVE    Specific Gravity, UA 1.028 1.000 - 1.030    Urine Hgb MOD (A) NEGATIVE    pH, UA 5.0 5.0 - 8.0    Protein, UA 1+ (A) NEGATIVE Urobilinogen, Urine Normal Normal    Nitrite, Urine NEGATIVE NEGATIVE    Leukocyte Esterase, Urine SMALL (A) NEGATIVE   CARBOXYHEMOGLOBIN   Result Value Ref Range    Carboxyhemoglobin 4.1 0 - 5 %   Troponin   Result Value Ref Range    Troponin, High Sensitivity <6 0 - 14 ng/L   Microscopic Urinalysis   Result Value Ref Range    WBC, UA 10 TO 20 /HPF    RBC, UA 3 to 5 /HPF    Casts UA 0 TO 2 /LPF    Epithelial Cells UA 6 TO 9 /HPF    Bacteria, UA FEW (A) None   EKG 12 Lead   Result Value Ref Range    Ventricular Rate 79 BPM    Atrial Rate 79 BPM    P-R Interval 116 ms    QRS Duration 82 ms    Q-T Interval 424 ms    QTc Calculation (Bazett) 486 ms    P Axis 36 degrees    R Axis 48 degrees    T Axis 28 degrees       IMPRESSION/MDM/ED COURSE:  42 y.o. female presented with weight loss, chest pain, shortness of breath x3 weeks. Patient slightly tachycardic and hypertensive on arrival the patient does appear anxious. Nontoxic in no acute distress. Heart tachycardic but regular. Lungs clear. Abdomen with mild epigastric abdominal tenderness. Tenderness with light touch to right side of back, over right side of ribs, and right anterior chest wall. No rash and duration of symptoms make shingles unlikely. We will obtain full cardiac work-up and abdominal pain labs. No RLQ tenderness. Abdomen soft without guarding/rebound. UA due to urinary symptoms, no suprapubic tenderness. Do not feel CT is necessary at this time. We will treat symptomatically with Toradol. ED Course as of 06/08/22 2115 Wed Jun 08, 2022 1931 Normal sinus rhythm. Normal axis. No ST elevation or depression. No T wave changes. Short NH interval 116, QTc 486.  [AF]   1948 CBC with Auto Differential(!):    WBC 9.9   RBC 4.19   Hemoglobin Quant 13.0   Hematocrit 37.7   MCV 90.0   MCH 31.0   MCHC 34.5   RDW 12.8   Platelet Count 433   MPV 8.0   Seg Neutrophils 63   Lymphocytes 25   Monocytes 8(!)   Eosinophils % 3   Basophils 1   Segs Absolute 6.40   Absolute Lymph # 2.40   Absolute Mono # 0.70   Absolute Eos # 0.30   Basophils Absolute 0.10 [AF]   2021 Comprehensive Metabolic Panel(!):    GLUCOSE, FASTING,(!)   BUN,BUNPL 14   Creatinine 0.73   CALCIUM, SERUM, 866475 9.1   Sodium 137   Potassium 4.1   Chloride 106   CO2 19(!)   Anion Gap 12   Alk Phos 85   ALT 8   AST 19   Bilirubin 0.35   Total Protein 6.5   Albumin 4.0   GFR Non- >60   GFR  >60   GFR Comment      [AF]   2022 TSH w/reflex to FT4:    TSH 0.40 [AF]   2022 D-Dimer, Quantitative:    D-Dimer, Quant 0.33 [AF]   2022 HCG Qualitative, Serum:    hCG Qual NEGATIVE [AF]   2022 Lipase(!):    Lipase 112(!) [AF]   2022 Magnesium:    Magnesium 2.1 [AF]   2022 Troponin:    Troponin, High Sensitivity <6 [AF]   2114 We will treat for cystitis with Keflex. There is blood in urine however patient is currently on her menstrual period. Single troponin as heart score less than 3 and symptoms of been going on for 3 weeks. CO2 4%. Will have patient follow-up with PCP Monday as previously scheduled. I discussed signs and symptoms that would require reevaluation in the ED. The patient expressed understanding and agreement with plan. All questions answered. [AF]      ED Course User Index  [AF] Joan Mendzoa DO     Heart score for ACS = 1  1. History - 0  Slightly suspicious: 0  Moderately suspicious: 1  Highly suspicious: 2  2. EKG - 0  Normal: 0  Non-specific repolarization disturbance:1  Significant ST depression: 2  3. Age - 0  <45: 0  45-64: 1  >65: 2  4. Risk Factors - 1  None known: 0  1-2: 1  >3: 2  5. Initial Troponin - 0  Normal limit: 0  1-3 x normal limit: 1  >3 x normal limit: 2        RADIOLOGY:  XR CHEST (2 VW)   Final Result   No acute cardiopulmonary process. EKG  Normal sinus rhythm. Normal axis. No ST elevation or depression. No T wave changes. Short NM interval 116, QTc 486.     All EKG's are interpreted by the Emergency Department Physician who either signs or Co-signs this chart in the absence of a cardiologist.    PROCEDURES:  None    CONSULTS:  None    FINAL IMPRESSION      1.  Acute cystitis without hematuria          DISPOSITION / PLAN     DISPOSITION Decision To Discharge 06/08/2022 09:08:21 PM      PATIENT REFERREDTO:  Tanesha Bridges, [address] [address]  [address] [facility]  [phone]    Schedule an appointment as soon as possible for a visit         DISCHARGE MEDICATIONS:  New Prescriptions    CEPHALEXIN (KEFLEX) 500 MG CAPSULE    Take 1 capsule by mouth 2 times daily for 5 days       Shruthi Tello DO  PGY 2  Resident Physician Emergency Medicine  06/08/22 9:15 PM    (Please note that portions of this note were completed with a voice recognition program.Efforts were made to edit the dictations but occasionally words are mis-transcribed.)       Cindy Hilario DO  Resident  06/08/22 2114       Cindy Hilario DO  Resident  06/08/22 2115

## 2022-06-08 NOTE — ED TRIAGE NOTES
Pt states dizziness, weight loss, and anxiety 3-4 weeks. Writer unable to follow pts story in triage d/t multiple complaints and pt talking in circles.

## 2022-06-08 NOTE — ED PROVIDER NOTES
EMERGENCY DEPARTMENT ENCOUNTER   ATTENDING ATTESTATION     Pt Name: Coy Barger  MRN: [id number]  [place] 1975  Date of evaluation: 6/8/22       Coy Barger is a 42 y.o. female who presents with Weight Loss      MDM:     This is a 24-year-old woman history of psychosis fibromyalgia complaining of multiple issues    It seems that her main complaint is some weight loss. Appears to be unintentional    She also states she has some right shoulder and chest area pain    She says the pain is worse by electronics being around her    High suspicion is paranoid delusions at this time however mildly tachycardic cardiac work-up initiated. Work-up is unremarkable here possible urinary tract infection. Discharged with antibiotics. She is requesting only. Vitals:   Vitals:    06/08/22 1748   BP: (!) 143/101   Pulse: (!) 108   Resp: 18   Temp: 97 °F (36.1 °C)   TempSrc: Temporal   SpO2: 99%         I personally saw and examined the patient. I have reviewed and agree with the resident's findings, including all diagnostic interpretations and treatment plan as written. I was present for the key portions of any procedures performed and the inclusive time noted for any critical care statement. The care is provided during an unprecedented national emergency due to the novel coronavirus, COVID 19.   Miguelangel Rhodes MD  Attending Emergency Physician          Miguelangel Rhodes MD  06/08/22 5949

## 2022-06-09 LAB
CULTURE: NORMAL
EKG ATRIAL RATE: 79 BPM
EKG P AXIS: 36 DEGREES
EKG P-R INTERVAL: 116 MS
EKG Q-T INTERVAL: 424 MS
EKG QRS DURATION: 82 MS
EKG QTC CALCULATION (BAZETT): 486 MS
EKG R AXIS: 48 DEGREES
EKG T AXIS: 28 DEGREES
EKG VENTRICULAR RATE: 79 BPM
SPECIMEN DESCRIPTION: NORMAL

## 2022-06-09 PROCEDURE — 93010 ELECTROCARDIOGRAM REPORT: CPT | Performed by: INTERNAL MEDICINE
